# Patient Record
Sex: FEMALE | Race: BLACK OR AFRICAN AMERICAN | Employment: UNEMPLOYED | URBAN - METROPOLITAN AREA
[De-identification: names, ages, dates, MRNs, and addresses within clinical notes are randomized per-mention and may not be internally consistent; named-entity substitution may affect disease eponyms.]

---

## 2017-01-09 ENCOUNTER — HOSPITAL ENCOUNTER (OUTPATIENT)
Dept: ENDOSCOPY | Age: 57
Discharge: HOME | End: 2017-01-09
Attending: INTERNAL MEDICINE | Admitting: INTERNAL MEDICINE

## 2017-01-09 VITALS
SYSTOLIC BLOOD PRESSURE: 129 MMHG | BODY MASS INDEX: 43.4 KG/M2 | WEIGHT: 293 LBS | DIASTOLIC BLOOD PRESSURE: 75 MMHG | OXYGEN SATURATION: 95 % | RESPIRATION RATE: 20 BRPM | HEART RATE: 102 BPM | TEMPERATURE: 97.3 F | HEIGHT: 69 IN

## 2017-01-09 RX ORDER — FUROSEMIDE 20 MG/1
20 TABLET ORAL DAILY
Status: ON HOLD | COMMUNITY
End: 2017-02-03 | Stop reason: HOSPADM

## 2017-01-09 RX ORDER — SODIUM CHLORIDE 9 MG/ML
INJECTION, SOLUTION INTRAVENOUS CONTINUOUS
Status: DISCONTINUED | OUTPATIENT
Start: 2017-01-09 | End: 2017-01-10 | Stop reason: HOSPADM

## 2017-01-09 RX ORDER — POTASSIUM CHLORIDE 1.5 G/1.77G
20 POWDER, FOR SOLUTION ORAL DAILY
COMMUNITY
End: 2017-06-27

## 2017-01-09 RX ADMIN — SODIUM CHLORIDE: 9 INJECTION, SOLUTION INTRAVENOUS at 09:46

## 2017-01-09 ASSESSMENT — PAIN - FUNCTIONAL ASSESSMENT: PAIN_FUNCTIONAL_ASSESSMENT: 0-10

## 2017-01-09 ASSESSMENT — PAIN SCALES - GENERAL
PAINLEVEL_OUTOF10: 0
PAINLEVEL_OUTOF10: 0

## 2017-01-29 PROBLEM — J96.22 ACUTE ON CHRONIC RESPIRATORY FAILURE WITH HYPOXIA AND HYPERCAPNIA (HCC): Status: ACTIVE | Noted: 2017-01-29

## 2017-01-29 PROBLEM — J96.21 ACUTE ON CHRONIC RESPIRATORY FAILURE WITH HYPOXIA AND HYPERCAPNIA (HCC): Status: ACTIVE | Noted: 2017-01-29

## 2017-02-06 ENCOUNTER — TELEPHONE (OUTPATIENT)
Dept: CARDIOLOGY CLINIC | Age: 57
End: 2017-02-06

## 2017-02-10 ENCOUNTER — OFFICE VISIT (OUTPATIENT)
Dept: CARDIOLOGY CLINIC | Age: 57
End: 2017-02-10

## 2017-02-10 VITALS
WEIGHT: 289 LBS | SYSTOLIC BLOOD PRESSURE: 120 MMHG | DIASTOLIC BLOOD PRESSURE: 58 MMHG | HEART RATE: 88 BPM | HEIGHT: 71 IN | OXYGEN SATURATION: 90 % | BODY MASS INDEX: 40.46 KG/M2

## 2017-02-10 DIAGNOSIS — J96.12 CHRONIC RESPIRATORY FAILURE WITH HYPOXIA AND HYPERCAPNIA (HCC): ICD-10-CM

## 2017-02-10 DIAGNOSIS — E87.6 HYPOKALEMIA: ICD-10-CM

## 2017-02-10 DIAGNOSIS — N17.9 AKI (ACUTE KIDNEY INJURY) (HCC): ICD-10-CM

## 2017-02-10 DIAGNOSIS — J96.11 CHRONIC RESPIRATORY FAILURE WITH HYPOXIA AND HYPERCAPNIA (HCC): ICD-10-CM

## 2017-02-10 DIAGNOSIS — I50.33 ACUTE ON CHRONIC DIASTOLIC HEART FAILURE (HCC): Primary | ICD-10-CM

## 2017-02-10 DIAGNOSIS — I10 ESSENTIAL HYPERTENSION: ICD-10-CM

## 2017-02-10 PROCEDURE — 3017F COLORECTAL CA SCREEN DOC REV: CPT | Performed by: NURSE PRACTITIONER

## 2017-02-10 PROCEDURE — G8417 CALC BMI ABV UP PARAM F/U: HCPCS | Performed by: NURSE PRACTITIONER

## 2017-02-10 PROCEDURE — 1036F TOBACCO NON-USER: CPT | Performed by: NURSE PRACTITIONER

## 2017-02-10 PROCEDURE — G8484 FLU IMMUNIZE NO ADMIN: HCPCS | Performed by: NURSE PRACTITIONER

## 2017-02-10 PROCEDURE — 1111F DSCHRG MED/CURRENT MED MERGE: CPT | Performed by: NURSE PRACTITIONER

## 2017-02-10 PROCEDURE — 99213 OFFICE O/P EST LOW 20 MIN: CPT | Performed by: NURSE PRACTITIONER

## 2017-02-10 PROCEDURE — 3014F SCREEN MAMMO DOC REV: CPT | Performed by: NURSE PRACTITIONER

## 2017-02-10 PROCEDURE — G8427 DOCREV CUR MEDS BY ELIG CLIN: HCPCS | Performed by: NURSE PRACTITIONER

## 2017-02-10 RX ORDER — ESCITALOPRAM OXALATE 10 MG/1
10 TABLET ORAL DAILY
COMMUNITY
End: 2019-08-22

## 2017-03-21 ENCOUNTER — HOSPITAL ENCOUNTER (OUTPATIENT)
Dept: GENERAL RADIOLOGY | Age: 57
Discharge: HOME | End: 2017-03-21
Attending: FAMILY MEDICINE | Admitting: FAMILY MEDICINE

## 2017-03-21 DIAGNOSIS — R13.10 PROBLEMS WITH SWALLOWING AND MASTICATION: ICD-10-CM

## 2017-03-21 DIAGNOSIS — R13.10 DYSPHAGIA, UNSPECIFIED TYPE: ICD-10-CM

## 2017-03-21 DIAGNOSIS — R13.10 DYSPHAGIA: ICD-10-CM

## 2017-04-13 ENCOUNTER — TELEPHONE (OUTPATIENT)
Dept: CARDIOLOGY CLINIC | Age: 57
End: 2017-04-13

## 2017-05-18 ENCOUNTER — OFFICE VISIT (OUTPATIENT)
Dept: CARDIOLOGY CLINIC | Age: 57
End: 2017-05-18

## 2017-05-18 VITALS
WEIGHT: 293 LBS | HEIGHT: 69 IN | SYSTOLIC BLOOD PRESSURE: 112 MMHG | HEART RATE: 84 BPM | BODY MASS INDEX: 43.4 KG/M2 | DIASTOLIC BLOOD PRESSURE: 68 MMHG | OXYGEN SATURATION: 90 %

## 2017-05-18 DIAGNOSIS — I50.33 ACUTE ON CHRONIC DIASTOLIC HEART FAILURE (HCC): Primary | ICD-10-CM

## 2017-05-18 DIAGNOSIS — G47.33 OSA (OBSTRUCTIVE SLEEP APNEA): ICD-10-CM

## 2017-05-18 DIAGNOSIS — I10 ESSENTIAL HYPERTENSION: ICD-10-CM

## 2017-05-18 DIAGNOSIS — F17.200 SMOKER: ICD-10-CM

## 2017-05-18 DIAGNOSIS — E87.6 HYPOKALEMIA: ICD-10-CM

## 2017-05-18 DIAGNOSIS — E66.01 MORBID OBESITY DUE TO EXCESS CALORIES (HCC): ICD-10-CM

## 2017-05-18 PROCEDURE — 4004F PT TOBACCO SCREEN RCVD TLK: CPT | Performed by: NURSE PRACTITIONER

## 2017-05-18 PROCEDURE — 3014F SCREEN MAMMO DOC REV: CPT | Performed by: NURSE PRACTITIONER

## 2017-05-18 PROCEDURE — 99214 OFFICE O/P EST MOD 30 MIN: CPT | Performed by: NURSE PRACTITIONER

## 2017-05-18 PROCEDURE — 3017F COLORECTAL CA SCREEN DOC REV: CPT | Performed by: NURSE PRACTITIONER

## 2017-05-18 PROCEDURE — G8427 DOCREV CUR MEDS BY ELIG CLIN: HCPCS | Performed by: NURSE PRACTITIONER

## 2017-05-18 PROCEDURE — G8417 CALC BMI ABV UP PARAM F/U: HCPCS | Performed by: NURSE PRACTITIONER

## 2017-05-18 RX ORDER — POTASSIUM CHLORIDE 1.5 G/1.77G
20 POWDER, FOR SOLUTION ORAL DAILY
Qty: 30 EACH | Refills: 3 | Status: SHIPPED | OUTPATIENT
Start: 2017-05-18 | End: 2017-06-22 | Stop reason: SDUPTHER

## 2017-05-18 RX ORDER — METOPROLOL SUCCINATE 25 MG/1
25 TABLET, EXTENDED RELEASE ORAL NIGHTLY
Qty: 30 TABLET | Refills: 3 | Status: SHIPPED | OUTPATIENT
Start: 2017-05-18 | End: 2017-08-30 | Stop reason: SDUPTHER

## 2017-05-18 RX ORDER — TORSEMIDE 20 MG/1
TABLET ORAL
Qty: 90 TABLET | Refills: 3 | Status: SHIPPED | OUTPATIENT
Start: 2017-05-18 | End: 2017-10-02 | Stop reason: SDUPTHER

## 2017-05-18 RX ORDER — METFORMIN HYDROCHLORIDE 500 MG/1
500 TABLET, EXTENDED RELEASE ORAL
COMMUNITY
End: 2018-09-17 | Stop reason: ALTCHOICE

## 2017-06-22 ENCOUNTER — OFFICE VISIT (OUTPATIENT)
Dept: CARDIOLOGY CLINIC | Age: 57
End: 2017-06-22

## 2017-06-22 VITALS
OXYGEN SATURATION: 92 % | HEIGHT: 68 IN | SYSTOLIC BLOOD PRESSURE: 122 MMHG | DIASTOLIC BLOOD PRESSURE: 76 MMHG | BODY MASS INDEX: 44.41 KG/M2 | WEIGHT: 293 LBS | HEART RATE: 80 BPM

## 2017-06-22 DIAGNOSIS — I50.812 CHRONIC RIGHT-SIDED HF (HEART FAILURE) (HCC): ICD-10-CM

## 2017-06-22 DIAGNOSIS — I10 ESSENTIAL HYPERTENSION: ICD-10-CM

## 2017-06-22 DIAGNOSIS — I50.33 ACUTE ON CHRONIC DIASTOLIC HEART FAILURE (HCC): ICD-10-CM

## 2017-06-22 DIAGNOSIS — Z72.0 TOBACCO USE: ICD-10-CM

## 2017-06-22 DIAGNOSIS — E66.01 MORBID OBESITY DUE TO EXCESS CALORIES (HCC): ICD-10-CM

## 2017-06-22 DIAGNOSIS — G47.33 OSA (OBSTRUCTIVE SLEEP APNEA): ICD-10-CM

## 2017-06-22 DIAGNOSIS — I50.33 ACUTE ON CHRONIC DIASTOLIC HEART FAILURE (HCC): Primary | ICD-10-CM

## 2017-06-22 DIAGNOSIS — E87.6 HYPOKALEMIA: ICD-10-CM

## 2017-06-22 LAB
ANION GAP SERPL CALCULATED.3IONS-SCNC: 15 MMOL/L (ref 3–16)
BUN BLDV-MCNC: 21 MG/DL (ref 7–20)
CALCIUM SERPL-MCNC: 10.5 MG/DL (ref 8.3–10.6)
CHLORIDE BLD-SCNC: 100 MMOL/L (ref 99–110)
CO2: 27 MMOL/L (ref 21–32)
CREAT SERPL-MCNC: 0.8 MG/DL (ref 0.6–1.1)
GFR AFRICAN AMERICAN: >60
GFR NON-AFRICAN AMERICAN: >60
GLUCOSE BLD-MCNC: 73 MG/DL (ref 70–99)
POTASSIUM SERPL-SCNC: 5.5 MMOL/L (ref 3.5–5.1)
SODIUM BLD-SCNC: 142 MMOL/L (ref 136–145)

## 2017-06-22 PROCEDURE — G8427 DOCREV CUR MEDS BY ELIG CLIN: HCPCS | Performed by: NURSE PRACTITIONER

## 2017-06-22 PROCEDURE — 3014F SCREEN MAMMO DOC REV: CPT | Performed by: NURSE PRACTITIONER

## 2017-06-22 PROCEDURE — 99214 OFFICE O/P EST MOD 30 MIN: CPT | Performed by: NURSE PRACTITIONER

## 2017-06-22 PROCEDURE — G8417 CALC BMI ABV UP PARAM F/U: HCPCS | Performed by: NURSE PRACTITIONER

## 2017-06-22 PROCEDURE — 4004F PT TOBACCO SCREEN RCVD TLK: CPT | Performed by: NURSE PRACTITIONER

## 2017-06-22 PROCEDURE — 3017F COLORECTAL CA SCREEN DOC REV: CPT | Performed by: NURSE PRACTITIONER

## 2017-06-22 RX ORDER — FERROUS SULFATE 325(65) MG
325 TABLET ORAL
COMMUNITY
End: 2019-05-23 | Stop reason: ALTCHOICE

## 2017-06-22 RX ORDER — LORAZEPAM 1 MG/1
1 TABLET ORAL EVERY 8 HOURS PRN
Status: ON HOLD | COMMUNITY
End: 2019-04-22

## 2017-06-22 RX ORDER — LORATADINE 10 MG/1
10 TABLET ORAL DAILY
COMMUNITY

## 2017-06-22 RX ORDER — DIVALPROEX SODIUM 500 MG/1
500 TABLET, DELAYED RELEASE ORAL
COMMUNITY
End: 2019-04-30

## 2017-06-22 RX ORDER — LISINOPRIL 10 MG/1
10 TABLET ORAL DAILY
Status: ON HOLD | COMMUNITY
End: 2019-04-22

## 2017-08-30 DIAGNOSIS — I10 ESSENTIAL HYPERTENSION: ICD-10-CM

## 2017-08-30 DIAGNOSIS — I50.33 ACUTE ON CHRONIC DIASTOLIC HEART FAILURE (HCC): ICD-10-CM

## 2017-08-31 RX ORDER — METOPROLOL SUCCINATE 25 MG/1
TABLET, EXTENDED RELEASE ORAL
Qty: 30 TABLET | Refills: 5 | Status: SHIPPED | OUTPATIENT
Start: 2017-08-31 | End: 2017-09-22 | Stop reason: SDUPTHER

## 2017-09-22 ENCOUNTER — OFFICE VISIT (OUTPATIENT)
Dept: CARDIOLOGY CLINIC | Age: 57
End: 2017-09-22

## 2017-09-22 VITALS
DIASTOLIC BLOOD PRESSURE: 80 MMHG | HEIGHT: 69 IN | BODY MASS INDEX: 43.4 KG/M2 | OXYGEN SATURATION: 96 % | WEIGHT: 293 LBS | SYSTOLIC BLOOD PRESSURE: 110 MMHG | HEART RATE: 56 BPM

## 2017-09-22 DIAGNOSIS — I10 ESSENTIAL HYPERTENSION: ICD-10-CM

## 2017-09-22 DIAGNOSIS — Z72.0 TOBACCO USE: ICD-10-CM

## 2017-09-22 DIAGNOSIS — I50.32 CHRONIC DIASTOLIC HF (HEART FAILURE) (HCC): Primary | ICD-10-CM

## 2017-09-22 DIAGNOSIS — E66.01 MORBID OBESITY DUE TO EXCESS CALORIES (HCC): ICD-10-CM

## 2017-09-22 DIAGNOSIS — I50.33 ACUTE ON CHRONIC DIASTOLIC HEART FAILURE (HCC): ICD-10-CM

## 2017-09-22 DIAGNOSIS — G47.33 OSA (OBSTRUCTIVE SLEEP APNEA): ICD-10-CM

## 2017-09-22 DIAGNOSIS — I50.812 CHRONIC RIGHT-SIDED HF (HEART FAILURE) (HCC): ICD-10-CM

## 2017-09-22 PROCEDURE — 3014F SCREEN MAMMO DOC REV: CPT | Performed by: NURSE PRACTITIONER

## 2017-09-22 PROCEDURE — 99214 OFFICE O/P EST MOD 30 MIN: CPT | Performed by: NURSE PRACTITIONER

## 2017-09-22 PROCEDURE — G8417 CALC BMI ABV UP PARAM F/U: HCPCS | Performed by: NURSE PRACTITIONER

## 2017-09-22 PROCEDURE — 4004F PT TOBACCO SCREEN RCVD TLK: CPT | Performed by: NURSE PRACTITIONER

## 2017-09-22 PROCEDURE — 3017F COLORECTAL CA SCREEN DOC REV: CPT | Performed by: NURSE PRACTITIONER

## 2017-09-22 PROCEDURE — G8427 DOCREV CUR MEDS BY ELIG CLIN: HCPCS | Performed by: NURSE PRACTITIONER

## 2017-09-22 RX ORDER — ACETAMINOPHEN 500 MG
500 TABLET ORAL EVERY 6 HOURS PRN
COMMUNITY
End: 2019-05-23

## 2017-09-22 RX ORDER — POLYETHYLENE GLYCOL 3350 17 G/17G
17 POWDER, FOR SOLUTION ORAL DAILY PRN
COMMUNITY

## 2017-09-22 RX ORDER — POTASSIUM CHLORIDE 20 MEQ/1
20 TABLET, EXTENDED RELEASE ORAL DAILY
COMMUNITY
End: 2017-09-22 | Stop reason: ALTCHOICE

## 2017-09-22 RX ORDER — SODIUM CHLORIDE 1000 MG
1 TABLET, SOLUBLE MISCELLANEOUS DAILY
COMMUNITY
End: 2019-07-31

## 2017-09-22 RX ORDER — ONDANSETRON 4 MG/1
4 TABLET, ORALLY DISINTEGRATING ORAL EVERY 8 HOURS PRN
COMMUNITY
End: 2019-07-31 | Stop reason: ALTCHOICE

## 2017-09-22 RX ORDER — METOPROLOL SUCCINATE 25 MG/1
12.5 TABLET, EXTENDED RELEASE ORAL DAILY
Qty: 30 TABLET | Refills: 5
Start: 2017-09-22

## 2017-09-22 RX ORDER — ASCORBIC ACID 500 MG
500 TABLET ORAL 2 TIMES DAILY
COMMUNITY
End: 2019-05-23 | Stop reason: ALTCHOICE

## 2017-10-02 DIAGNOSIS — I50.33 ACUTE ON CHRONIC DIASTOLIC HEART FAILURE (HCC): ICD-10-CM

## 2017-10-03 RX ORDER — TORSEMIDE 20 MG/1
TABLET ORAL
Qty: 162 TABLET | Refills: 3 | Status: SHIPPED | OUTPATIENT
Start: 2017-10-03 | End: 2018-09-17 | Stop reason: ALTCHOICE

## 2018-04-05 ENCOUNTER — TELEPHONE (OUTPATIENT)
Dept: CARDIOLOGY CLINIC | Age: 58
End: 2018-04-05

## 2018-04-20 ENCOUNTER — OFFICE VISIT (OUTPATIENT)
Dept: CARDIOLOGY CLINIC | Age: 58
End: 2018-04-20

## 2018-04-20 VITALS
DIASTOLIC BLOOD PRESSURE: 72 MMHG | SYSTOLIC BLOOD PRESSURE: 116 MMHG | WEIGHT: 287 LBS | HEART RATE: 102 BPM | BODY MASS INDEX: 43.5 KG/M2 | OXYGEN SATURATION: 90 % | HEIGHT: 68 IN

## 2018-04-20 DIAGNOSIS — G47.33 OSA (OBSTRUCTIVE SLEEP APNEA): ICD-10-CM

## 2018-04-20 DIAGNOSIS — I10 ESSENTIAL HYPERTENSION: ICD-10-CM

## 2018-04-20 DIAGNOSIS — Z72.0 TOBACCO ABUSE: ICD-10-CM

## 2018-04-20 DIAGNOSIS — I50.32 CHRONIC DIASTOLIC HEART FAILURE (HCC): ICD-10-CM

## 2018-04-20 DIAGNOSIS — I50.32 CHRONIC DIASTOLIC HEART FAILURE (HCC): Primary | ICD-10-CM

## 2018-04-20 LAB
ANION GAP SERPL CALCULATED.3IONS-SCNC: 19 MMOL/L (ref 3–16)
BUN BLDV-MCNC: 14 MG/DL (ref 7–20)
CALCIUM SERPL-MCNC: 10.3 MG/DL (ref 8.3–10.6)
CHLORIDE BLD-SCNC: 96 MMOL/L (ref 99–110)
CO2: 26 MMOL/L (ref 21–32)
CREAT SERPL-MCNC: 0.5 MG/DL (ref 0.6–1.1)
GFR AFRICAN AMERICAN: >60
GFR NON-AFRICAN AMERICAN: >60
GLUCOSE BLD-MCNC: 78 MG/DL (ref 70–99)
POTASSIUM SERPL-SCNC: 4.4 MMOL/L (ref 3.5–5.1)
SODIUM BLD-SCNC: 141 MMOL/L (ref 136–145)

## 2018-04-20 PROCEDURE — 99214 OFFICE O/P EST MOD 30 MIN: CPT | Performed by: INTERNAL MEDICINE

## 2018-05-17 ENCOUNTER — OFFICE VISIT (OUTPATIENT)
Dept: PULMONOLOGY | Age: 58
End: 2018-05-17

## 2018-05-17 VITALS
OXYGEN SATURATION: 96 % | RESPIRATION RATE: 20 BRPM | SYSTOLIC BLOOD PRESSURE: 113 MMHG | DIASTOLIC BLOOD PRESSURE: 79 MMHG | HEIGHT: 68 IN | HEART RATE: 86 BPM | WEIGHT: 292 LBS | BODY MASS INDEX: 44.25 KG/M2 | TEMPERATURE: 98.4 F

## 2018-05-17 DIAGNOSIS — J39.8 TRACHEAL STENOSIS: ICD-10-CM

## 2018-05-17 DIAGNOSIS — J44.9 COPD, SEVERE (HCC): Primary | ICD-10-CM

## 2018-05-17 DIAGNOSIS — J96.11 CHRONIC RESPIRATORY FAILURE WITH HYPOXIA (HCC): ICD-10-CM

## 2018-05-17 DIAGNOSIS — Z72.0 TOBACCO ABUSE: ICD-10-CM

## 2018-05-17 PROCEDURE — 99214 OFFICE O/P EST MOD 30 MIN: CPT | Performed by: INTERNAL MEDICINE

## 2018-05-17 RX ORDER — ALBUTEROL SULFATE 90 UG/1
2 AEROSOL, METERED RESPIRATORY (INHALATION) EVERY 4 HOURS PRN
Qty: 1 INHALER | Refills: 11 | Status: ON HOLD | OUTPATIENT
Start: 2018-05-17 | End: 2019-04-22 | Stop reason: SDUPTHER

## 2018-08-01 ENCOUNTER — TELEPHONE (OUTPATIENT)
Dept: PULMONOLOGY | Age: 58
End: 2018-08-01

## 2018-08-01 NOTE — TELEPHONE ENCOUNTER
Left message asking for patient to call [phone] to schedule CT Chest then to call the office back to let us know that it was scheduled.

## 2018-08-10 ENCOUNTER — HOSPITAL ENCOUNTER (OUTPATIENT)
Dept: CT IMAGING | Age: 58
Discharge: HOME | End: 2018-08-10
Attending: INTERNAL MEDICINE | Admitting: INTERNAL MEDICINE

## 2018-08-10 DIAGNOSIS — Z72.0 TOBACCO ABUSE: ICD-10-CM

## 2018-08-10 DIAGNOSIS — J44.9 COPD, SEVERE (HCC): ICD-10-CM

## 2018-08-10 DIAGNOSIS — J44.9 CHRONIC OBSTRUCTIVE PULMONARY DISEASE (HCC): ICD-10-CM

## 2018-08-10 DIAGNOSIS — J39.8 TRACHEAL STENOSIS: ICD-10-CM

## 2018-08-14 ENCOUNTER — TELEPHONE (OUTPATIENT)
Dept: PULMONOLOGY | Age: 58
End: 2018-08-14

## 2018-08-15 NOTE — TELEPHONE ENCOUNTER
Cristel called in that they have made final denial and they are sending a fax that if we want to make appeal we can.

## 2018-08-15 NOTE — TELEPHONE ENCOUNTER
Insurance said that pref procedure is code  low dose CT scan vs regular ct chest   based on clinical information that was supplied     They are saying because the procedure was already done then an appeal would need to be processed. I have informed Stacey of this.

## 2018-08-16 NOTE — TELEPHONE ENCOUNTER
I spoke to [facility] at insurance verification [id number]. She said that the prior auth was initiated on 8/9/18 they don't cancell the test unless more than 3 days out and if denied the patient won't be liable. She did say that the case is now in denials and appeals department and they only take faxes there is no email and no phone number - we can send a fax to [phone].

## 2018-09-17 ENCOUNTER — OFFICE VISIT (OUTPATIENT)
Dept: PULMONOLOGY | Age: 58
End: 2018-09-17

## 2018-09-17 VITALS
OXYGEN SATURATION: 92 % | HEIGHT: 68 IN | TEMPERATURE: 98.5 F | BODY MASS INDEX: 44.41 KG/M2 | HEART RATE: 107 BPM | WEIGHT: 293 LBS | DIASTOLIC BLOOD PRESSURE: 69 MMHG | SYSTOLIC BLOOD PRESSURE: 101 MMHG | RESPIRATION RATE: 20 BRPM

## 2018-09-17 DIAGNOSIS — J40 BRONCHITIS: ICD-10-CM

## 2018-09-17 DIAGNOSIS — J44.9 COPD, SEVERE (HCC): Primary | ICD-10-CM

## 2018-09-17 DIAGNOSIS — J96.11 CHRONIC RESPIRATORY FAILURE WITH HYPOXIA (HCC): ICD-10-CM

## 2018-09-17 DIAGNOSIS — Z72.0 TOBACCO ABUSE: ICD-10-CM

## 2018-09-17 DIAGNOSIS — J39.8 TRACHEAL STENOSIS: ICD-10-CM

## 2018-09-17 PROCEDURE — 99214 OFFICE O/P EST MOD 30 MIN: CPT | Performed by: INTERNAL MEDICINE

## 2018-09-17 RX ORDER — FUROSEMIDE 40 MG/1
40 TABLET ORAL DAILY
COMMUNITY
End: 2018-10-22 | Stop reason: DRUGHIGH

## 2018-09-17 RX ORDER — CLONAZEPAM 0.5 MG/1
0.5 TABLET ORAL 2 TIMES DAILY PRN
COMMUNITY
End: 2019-05-23 | Stop reason: DRUGHIGH

## 2018-09-17 RX ORDER — ALBUTEROL SULFATE 2.5 MG/3ML
2.5 SOLUTION RESPIRATORY (INHALATION) EVERY 4 HOURS PRN
Qty: 360 VIAL | Refills: 3 | Status: ON HOLD | OUTPATIENT
Start: 2018-09-17 | End: 2019-04-22 | Stop reason: SDUPTHER

## 2018-09-17 RX ORDER — AZITHROMYCIN 250 MG/1
250 TABLET, FILM COATED ORAL DAILY
Qty: 6 TABLET | Refills: 0 | Status: SHIPPED | OUTPATIENT
Start: 2018-09-17 | End: 2019-01-21 | Stop reason: ALTCHOICE

## 2018-09-17 NOTE — PATIENT INSTRUCTIONS
Need to take Advair one puff twice a day    Need to take spiriva one puff once a day    Use albuterol as needed for shortness of breath. Can use albuterol every 4 hours as needed    Will send script for albuterol to [facility].   Use with Klash machine    Take azithromycin as directed    Try to quit tobacco    Stay active

## 2018-09-17 NOTE — PROGRESS NOTES
time.  She does need to continue with inhalers.   Needs to stop smoking too      Pulmonary Rehab:  Ordered last time but she says she cannot attend due to receiving home care    Lung Cancer Screening CT:  Up to date as of 8/2018    Follow up in 4 months

## 2018-10-22 ENCOUNTER — OFFICE VISIT (OUTPATIENT)
Dept: CARDIOLOGY CLINIC | Age: 58
End: 2018-10-22
Payer: COMMERCIAL

## 2018-10-22 VITALS
SYSTOLIC BLOOD PRESSURE: 100 MMHG | HEIGHT: 68 IN | OXYGEN SATURATION: 88 % | WEIGHT: 293 LBS | DIASTOLIC BLOOD PRESSURE: 60 MMHG | BODY MASS INDEX: 44.41 KG/M2 | HEART RATE: 106 BPM

## 2018-10-22 DIAGNOSIS — I50.32 CHRONIC DIASTOLIC HEART FAILURE (HCC): ICD-10-CM

## 2018-10-22 DIAGNOSIS — I50.32 CHRONIC DIASTOLIC HEART FAILURE (HCC): Primary | ICD-10-CM

## 2018-10-22 DIAGNOSIS — G47.33 OSA (OBSTRUCTIVE SLEEP APNEA): ICD-10-CM

## 2018-10-22 DIAGNOSIS — I10 ESSENTIAL HYPERTENSION: ICD-10-CM

## 2018-10-22 DIAGNOSIS — Z72.0 TOBACCO USE: ICD-10-CM

## 2018-10-22 LAB
ANION GAP SERPL CALCULATED.3IONS-SCNC: 19 MMOL/L (ref 3–16)
BUN BLDV-MCNC: 16 MG/DL (ref 7–20)
CALCIUM SERPL-MCNC: 10.2 MG/DL (ref 8.3–10.6)
CHLORIDE BLD-SCNC: 95 MMOL/L (ref 99–110)
CO2: 26 MMOL/L (ref 21–32)
CREAT SERPL-MCNC: 0.8 MG/DL (ref 0.6–1.2)
GFR AFRICAN AMERICAN: >60
GFR NON-AFRICAN AMERICAN: >60
GLUCOSE BLD-MCNC: 71 MG/DL (ref 70–99)
POTASSIUM SERPL-SCNC: 5.1 MMOL/L (ref 3.5–5.1)
SODIUM BLD-SCNC: 140 MMOL/L (ref 136–145)

## 2018-10-22 PROCEDURE — 93000 ELECTROCARDIOGRAM COMPLETE: CPT | Performed by: INTERNAL MEDICINE

## 2018-10-22 PROCEDURE — 99214 OFFICE O/P EST MOD 30 MIN: CPT | Performed by: INTERNAL MEDICINE

## 2018-10-22 RX ORDER — TORSEMIDE 20 MG/1
20 TABLET ORAL 2 TIMES DAILY
Qty: 60 TABLET | Refills: 3 | Status: SHIPPED | OUTPATIENT
Start: 2018-10-22 | End: 2019-02-17 | Stop reason: SDUPTHER

## 2018-10-22 RX ORDER — FUROSEMIDE 40 MG/1
40 TABLET ORAL 2 TIMES DAILY
Qty: 60 TABLET | Refills: 5 | Status: SHIPPED
Start: 2018-10-22 | End: 2018-10-22

## 2018-10-22 NOTE — PATIENT INSTRUCTIONS
Patient Education        Heart Failure: Care Instructions  Your Care Instructions    Heart failure occurs when your heart does not pump as much blood as the body needs. Failure does not mean that the heart has stopped pumping but rather that it is not pumping as well as it should. Over time, this causes fluid buildup in your lungs and other parts of your body. Fluid buildup can cause shortness of breath, fatigue, swollen ankles, and other problems. By taking medicines regularly, reducing sodium (salt) in your diet, checking your weight every day, and making lifestyle changes, you can feel better and live longer. Follow-up care is a key part of your treatment and safety. Be sure to make and go to all appointments, and call your doctor if you are having problems. It's also a good idea to know your test results and keep a list of the medicines you take. How can you care for yourself at home? Medicines    · Be safe with medicines. Take your medicines exactly as prescribed. Call your doctor if you think you are having a problem with your medicine.     · Do not take any vitamins, over-the-counter medicine, or herbal products without talking to your doctor first. Dahlia Pepper not take ibuprofen (Advil or Motrin) and naproxen (Aleve) without talking to your doctor first. They could make your heart failure worse.     · You may be taking some of the following medicine. ¨ Beta-blockers can slow heart rate, decrease blood pressure, and improve your condition. Taking a beta-blocker may lower your chance of needing to be hospitalized. ¨ Angiotensin-converting enzyme inhibitors (ACEIs) reduce the heart's workload, lower blood pressure, and reduce swelling. Taking an ACEI may lower your chance of needing to be hospitalized again. ¨ Angiotensin II receptor blockers (ARBs) work like ACEIs. Your doctor may prescribe them instead of ACEIs. ¨ Diuretics, also called water pills, reduce swelling.   ¨ Potassium supplements replace this quitting, talk to your doctor about stop-smoking programs and medicines. These can increase your chances of quitting for good. Quitting smoking may be the most important step you can take to protect your heart.     · Limit alcohol to 2 drinks a day for men and 1 drink a day for women. Too much alcohol can cause health problems.     · Avoid getting sick from colds and the flu. Get a pneumococcal vaccine shot. If you have had one before, ask your doctor whether you need another dose. Get a flu shot each year. If you must be around people with colds or the flu, wash your hands often. When should you call for help? Call 911 if you have symptoms of sudden heart failure such as:    · You have severe trouble breathing.     · You cough up pink, foamy mucus.     · You have a new irregular or rapid heartbeat.    Call your doctor now or seek immediate medical care if:    · You have new or increased shortness of breath.     · You are dizzy or lightheaded, or you feel like you may faint.     · You have sudden weight gain, such as more than 2 to 3 pounds in a day or 5 pounds in a week. (Your doctor may suggest a different range of weight gain.)     · You have increased swelling in your legs, ankles, or feet.     · You are suddenly so tired or weak that you cannot do your usual activities.    Watch closely for changes in your health, and be sure to contact your doctor if you develop new symptoms. Where can you learn more? Go to https://LineRate Systems.[employer] org and sign in to your Ca[employer] account. Enter O019 in the BuscapÃ© box to learn more about \"Heart Failure: Care Instructions. \"     If you do not have an account, please click on the \"Sign Up Now\" link. Current as of: May 10, 2017  Content Version: 11.7  © 0398-6607 [employer], Incorporated. Care instructions adapted under license by [email or web address] ([facility]).  If you have questions about a medical condition or this instruction, always ask your healthcare professional. [address] any warranty or liability for your use of this information. Patient Education        Low Sodium Diet (2,000 Milligram): Care Instructions  Your Care Instructions    Too much sodium causes your body to hold on to extra water. This can raise your blood pressure and force your heart and kidneys to work harder. In very serious cases, this could cause you to be put in the hospital. It might even be life-threatening. By limiting sodium, you will feel better and lower your risk of serious problems. The most common source of sodium is salt. People get most of the salt in their diet from canned, prepared, and packaged foods. Fast food and restaurant meals also are very high in sodium. Your doctor will probably limit your sodium to less than 2,000 milligrams (mg) a day. This limit counts all the sodium in prepared and packaged foods and any salt you add to your food. Follow-up care is a key part of your treatment and safety. Be sure to make and go to all appointments, and call your doctor if you are having problems. It's also a good idea to know your test results and keep a list of the medicines you take. How can you care for yourself at home? Read food labels  · Read labels on cans and food packages. The labels tell you how much sodium is in each serving. Make sure that you look at the serving size. If you eat more than the serving size, you have eaten more sodium. · Food labels also tell you the Percent Daily Value for sodium. Choose products with low Percent Daily Values for sodium. · Be aware that sodium can come in forms other than salt, including monosodium glutamate (MSG), sodium citrate, and sodium bicarbonate (baking soda). MSG is often added to Asian food. When you eat out, you can sometimes ask for food without MSG or added salt.   Buy low-sodium foods  · Buy foods that are labeled \"unsalted\" (no salt added), \"sodium-free\" (less than 5 mg of sodium per low-sodium. ¨ Canned vegetables, unless labeled sodium-free or low-sodium. ¨ Western Tamanna fries, pizza, tacos, and other fast foods. ¨ Pickles, olives, ketchup, and other condiments, especially soy sauce, unless labeled sodium-free or low-sodium. Where can you learn more? Go to https://Simple-Fillperamanaewsera.Apptera. org and sign in to your Favbuy account. Enter W703 in the Glasses Direct box to learn more about \"Low Sodium Diet (2,000 Milligram): Care Instructions. \"     If you do not have an account, please click on the \"Sign Up Now\" link. Current as of: May 12, 2017  Content Version: 11.7  © 7184-3287 CommonTime, Incorporated. Care instructions adapted under license by [facility] ([facility]). If you have questions about a medical condition or this instruction, always ask your healthcare professional. [place] [address] any warranty or liability for your use of this information. ONCE YOU FINISH FUROSEMIDE PRESCRIPTION, BEGIN TAKING TORSEMIDE 20 MG TWICE DAILY (MORNING AND EVENING). LOW SODIUM DIET, NO MORE THAT 2,000 MILLIGRAMS OF SODIUM DAILY. FLUID RESTRICTION, NO MORE THAN 48 OUNCES OF FLUID DAILY.

## 2018-11-08 ENCOUNTER — HOSPITAL ENCOUNTER (OUTPATIENT)
Dept: CARDIAC REHAB | Age: 58
Setting detail: THERAPIES SERIES
Discharge: HOME | End: 2018-11-08
Payer: COMMERCIAL

## 2018-12-27 ENCOUNTER — HOSPITAL ENCOUNTER (OUTPATIENT)
Dept: CARDIAC REHAB | Age: 58
Setting detail: THERAPIES SERIES
Discharge: HOME | End: 2018-12-27
Payer: COMMERCIAL

## 2018-12-27 VITALS — BODY MASS INDEX: 45.92 KG/M2 | WEIGHT: 293 LBS

## 2018-12-27 PROCEDURE — G0424 PULMONARY REHAB W EXER: HCPCS

## 2018-12-27 NOTE — PROGRESS NOTES
[facility] PULMONARY REHABILITATION ORDER  Medical Director:  Dr. Cassie Willis  (X)Phase II Pulmonary Rehabilitation [facility] Facility-based, supervised exercise with SpO2 / HR monitoring and Oxygen Titration (if necessary) each session, 2-3 times weekly, with individualized education sessions. Patient Name: Marcelina Bernard  : 1960  Referring Physician: Dr. Olegario Adams   Date: 2018    Medically Necessary Pulmonary Rehab for:  Severe COPD (from my dictation or the PFT report), which is GOLD Stage 3. Physician Prescribed Exercise:  Length of program:  Up to 36 sessions, subject to insurance limitations. Program to include aerobic endurance conditioning, resistance training (RT), step training (ST), flexibility training, and education (all relevant topics including psychosocial assessment). FITT Principles + Progression for Exercise Prescription (also found on the ITP):     Frequency: 2-3x / wk for up to 36 sessions    Intensity:   Set from Initial 6MWT (Feet achieved converted to METs)   Type:          Aerobic and Strength (Treadmill, AD, NuStep, SciFit, UBE, RT, ST)   Time:        15-60 min. of Treatment; Aerobic, RT, ST, Rests and Education  Progression:  1-2 minute increase in Time, per Type, per session, 5-20% increase in Intensity per week if SpO2 >88 AND [place] RPE/RPD is <14      Note:  These are guidelines. The Pulmonary Rehab staff may adjust the treatment to suit the patient's individual needs, goals, oxygen saturation and functional level. Plan of Care:  Pulmonary Rehab aerobic endurance and strength training sessions for a total of 30-91 min/day, including Education time, 2-3 days/week with suggested supplemented matching minutes of walking at home on most days not participating in Pulmonary Rehab. Patient is willing to cooperate and participate in the plan of care.  Patient is physically able, motivated, and willing to participate in HI, and I

## 2018-12-27 NOTE — PROGRESS NOTES
[facility] Facility-Based Therapy for COPD  INDIVIDUAL TREATMENT PLAN (ITP)     NAME: Sarah Jimenez  YOB: 1960  Account Number: [de-identified]  AGE: 59 y.o. Diagnosis: Severe COPD which is GOLD Stage 3. PFT: Post Bronch FEV1/FVC: 85%, FEV1: 33% FVC: 37%  Notes: Medicare requirements for Pulmonary Rehabilitation include a PFT within the last 12 months revealing: FEV1/FVC <70, and FEV1 <80%, and documentation from the referring physician stating that the patient has quit smoking or will participate in smoking cessation activities prior to, or during the Pulmonary Rehab program.  A 6 Minute Walk Test (6 MWT) at the beginning and end of the program is also indicated.   GLOSSARY: PF= Physical Fitness  TM=Treadmill  AD= Schwinn AirDyne Bike  UBE=Upperbody Ergometry LBE=Lowerbody Ergometer  NS=NuStep SF= SciFit  ST=Step Training  RT=Resistance Training   PLB=Pursed Lip Breathing   DY= Dynabands  HW= Handweights   Cybex RT= Cybex Mult istation weight machine   RB=Recumbent    REFERRING PHYSICIAN: Dr. Claire Ambrocio History:    Last hospital admit for Pulmonary issue:     Past Medical History:   Diagnosis Date    [facility]     Arthritis     Asthma     CAD (coronary artery disease)     Congestive heart failure (HCC)     COPD (chronic obstructive pulmonary disease) (HCC)     Dependence on supplemental oxygen     GERD (gastroesophageal reflux disease)     Heart disease     Hypertension     Muscle weakness     Obesity     Schizophrenia ([address]     Type 2 diabetes mellitus without complication ([facility]        Surgical History:     Past Surgical History:   Procedure Laterality Date    ANKLE SURGERY      error       Major Symptoms:     Cough/sputum Yes- white sputum      Wheezing  Yes     Chest Discomfort  No     Orthopnea  Yes- 3-4 pillows     Sleep Disturbances Yes wakes 3 x nightly to If pt has balance or orthopedic issues:  Interventions:                    Rate your physical   fitness (PF)?:   /10        -30 day PF goal:  /10    EDUCATION  [] Understands proper set/up O2 use  []  Understands SpO2 and RPD? [] Aerobic progression explained? []  Intensity progression explained? GOALS                                                                                           Rate your physical   fitness (PF)?:   /10        -30 day PF goal:  /10    EDUCATION   []  Home Activity education offered  [] Stretching/ Flexibility education offered  [] Attended Benefits of Exercise Class  []  Attended Resistance Training Class                                                    GOALS                                                                                           Rate your physical   fitness (PF)?:   /10    -30 day PF goal:  /10    EDUCATION  [] Attended all individually relevant exercise education sessions? []  Knows current exercise goals and recmndtns?:                                                  Goals Achieved? Rate your physical fitness now?:     /10  Handgrip:  Right:  Left:    Discharge  EDUCATION  []  Attended all individually relevant exercise education sessions?    [] Knows current exercise goals and recmndtns?:                                                                                        PHYSICIAN DIRECTED   EXERCISE RX     RPE : 11 - 14  Titrate O2 to keep SpO2 >89%    Frequency (F): 2-3x/wk in Rehab,         Initial Intensity : 1.7 METs (from 6MWT)   1-1.4 ( 60-80% of walk speed for TM)    Type (T): Aerobic (TM,NS, SF, AE, AB, RB, EL, Arc), RT 1-3#, 8-16 reps    CAN USE ALL EQUIPMENT EXCEPT       Time (Ti): Aerobic:   15-40 min               Progression: 1-2 min total time for TM, AB, NS, SF or Arm ergometer per session or when a steady state has occurred in RPE if  SpO2 and HR levels are acceptable           PHYSICIAN DIRECTED   EXERCISE RX       Titrate O2 to keep SpO2 >89%    Frequency (F): 2-3x/wk in Rehab, 1-3x/wk home walking       Intensity (I):  Initial + 5-10% increase each week or when a steady state has occurred in RPE if  SpO2 and HR levels are acceptable  Type (T)  Aerobic (TM,NS, SFR,SFS, AE, AB, RB), RT   8-16 reps    CAN USE ALL EQUIPMENT EXCEPT        Time (Ti): Aerobic:   30-40 min        Progression:   1-2 min total time for TM, AB, NS, SF or Arm ergometer per session or when a steady state has occurred in RPE if  SpO2 and HR levels are acceptable        Is pt. progressing in rehab?:               PHYSICIAN DIRECTED   EXERCISE RX       Titrate O2 to keep SpO2 >89%    Frequency (F): 2-3x/wk in Rehab, 1-3x/wk home walking       Intensity (I):  Initial + 5-10% increase each week when a steady state has occurred in RPE if  SpO2 and HR levels are acceptable  Type (T)  Aerobic (TM,NS, SFR,SFS, AE, AB, RB), RT   8-16 reps    CAN USE ALL EQUIPMENT EXCEPT        Time (Ti): Aerobic:   30-50 min     Progression:   1-2 min total time for TM, AB, NS, SF or Arm ergometer per session or when a steady state has occurred in RPE if  SpO2 and HR levels are acceptable              Is pt. progressing in rehab?:                 PHYSICIAN DIRECTED   EXERCISE RX       Titrate O2 to keep SpO2 >89%    Frequency (F): 2-3x/wk in Rehab, 1-3x/wk home walking       Intensity (I):  Initial + 5-10% increase each week when a steady state has occurred in RPE if  SpO2 and HR levels are acceptable  Type (T):   Aerobic (TM,NS, SFR,SFS, AE, AB, RB), RT   8-16 reps    CAN USE ALL EQUIPMENT EXCEPT          Time (Ti): Aerobic:   30-58 min         Progression:   1-2 min total time for TM, AB, NS, SF or Arm ergometer per session or when a steady state has occurred in RPE if  SpO2 and HR levels are acceptable            Is pt. progressing in rehab?:               Final Intensity (I): See below

## 2018-12-28 NOTE — PROGRESS NOTES
Patient presented today for her initial Pulmonary Rehab evaluation. Program explained. Tour of unit given and staff introduced. Patient instructed on warm up/cool down stretches. 10 min of exercise performed on cardiac monitor.

## 2019-01-03 ENCOUNTER — HOSPITAL ENCOUNTER (OUTPATIENT)
Dept: CARDIAC REHAB | Age: 59
Setting detail: THERAPIES SERIES
Discharge: HOME | End: 2019-01-03
Payer: COMMERCIAL

## 2019-01-03 VITALS — BODY MASS INDEX: 45.84 KG/M2 | WEIGHT: 293 LBS

## 2019-01-03 PROCEDURE — G0424 PULMONARY REHAB W EXER: HCPCS

## 2019-01-08 ENCOUNTER — HOSPITAL ENCOUNTER (OUTPATIENT)
Dept: CARDIAC REHAB | Age: 59
Setting detail: THERAPIES SERIES
Discharge: HOME | End: 2019-01-08
Payer: COMMERCIAL

## 2019-01-08 VITALS — BODY MASS INDEX: 46.24 KG/M2 | WEIGHT: 293 LBS

## 2019-01-08 PROCEDURE — G0424 PULMONARY REHAB W EXER: HCPCS

## 2019-01-10 ENCOUNTER — HOSPITAL ENCOUNTER (OUTPATIENT)
Dept: CARDIAC REHAB | Age: 59
Setting detail: THERAPIES SERIES
Discharge: HOME | End: 2019-01-10
Payer: COMMERCIAL

## 2019-01-10 VITALS — WEIGHT: 293 LBS | BODY MASS INDEX: 46.53 KG/M2

## 2019-01-10 PROCEDURE — G0424 PULMONARY REHAB W EXER: HCPCS

## 2019-01-15 ENCOUNTER — HOSPITAL ENCOUNTER (OUTPATIENT)
Dept: CARDIAC REHAB | Age: 59
Setting detail: THERAPIES SERIES
Discharge: HOME | End: 2019-01-15
Payer: COMMERCIAL

## 2019-01-15 VITALS — BODY MASS INDEX: 46.31 KG/M2 | WEIGHT: 293 LBS

## 2019-01-15 PROCEDURE — G0424 PULMONARY REHAB W EXER: HCPCS

## 2019-01-21 ENCOUNTER — OFFICE VISIT (OUTPATIENT)
Dept: PULMONOLOGY | Age: 59
End: 2019-01-21
Payer: COMMERCIAL

## 2019-01-21 VITALS
DIASTOLIC BLOOD PRESSURE: 60 MMHG | TEMPERATURE: 98.6 F | BODY MASS INDEX: 44.41 KG/M2 | SYSTOLIC BLOOD PRESSURE: 120 MMHG | HEIGHT: 68 IN | RESPIRATION RATE: 20 BRPM | HEART RATE: 111 BPM | OXYGEN SATURATION: 90 % | WEIGHT: 293 LBS

## 2019-01-21 DIAGNOSIS — J44.9 COPD, SEVERE (HCC): Primary | ICD-10-CM

## 2019-01-21 DIAGNOSIS — Z72.0 TOBACCO ABUSE: ICD-10-CM

## 2019-01-21 DIAGNOSIS — J39.8 TRACHEAL STENOSIS: ICD-10-CM

## 2019-01-21 DIAGNOSIS — J96.11 CHRONIC RESPIRATORY FAILURE WITH HYPOXIA (HCC): ICD-10-CM

## 2019-01-21 PROCEDURE — 99214 OFFICE O/P EST MOD 30 MIN: CPT | Performed by: INTERNAL MEDICINE

## 2019-01-21 RX ORDER — ALBUTEROL SULFATE 90 UG/1
2 AEROSOL, METERED RESPIRATORY (INHALATION) EVERY 4 HOURS PRN
Qty: 1 INHALER | Refills: 11 | Status: SHIPPED | OUTPATIENT
Start: 2019-01-21

## 2019-01-21 RX ORDER — ALBUTEROL SULFATE 2.5 MG/3ML
2.5 SOLUTION RESPIRATORY (INHALATION) EVERY 4 HOURS PRN
Qty: 360 VIAL | Refills: 11 | Status: SHIPPED | OUTPATIENT
Start: 2019-01-21

## 2019-01-22 ENCOUNTER — HOSPITAL ENCOUNTER (OUTPATIENT)
Dept: CARDIAC REHAB | Age: 59
Setting detail: THERAPIES SERIES
Discharge: HOME | End: 2019-01-22
Payer: COMMERCIAL

## 2019-01-22 VITALS — BODY MASS INDEX: 46.3 KG/M2 | WEIGHT: 293 LBS

## 2019-01-22 PROCEDURE — G0424 PULMONARY REHAB W EXER: HCPCS

## 2019-01-24 ENCOUNTER — HOSPITAL ENCOUNTER (OUTPATIENT)
Dept: CARDIAC REHAB | Age: 59
Setting detail: THERAPIES SERIES
Discharge: HOME | End: 2019-01-24
Payer: COMMERCIAL

## 2019-01-24 VITALS — BODY MASS INDEX: 46.68 KG/M2 | WEIGHT: 293 LBS

## 2019-01-24 PROCEDURE — G0424 PULMONARY REHAB W EXER: HCPCS

## 2019-01-29 ENCOUNTER — HOSPITAL ENCOUNTER (OUTPATIENT)
Dept: CARDIAC REHAB | Age: 59
Setting detail: THERAPIES SERIES
Discharge: HOME | End: 2019-01-29
Payer: COMMERCIAL

## 2019-01-29 VITALS — BODY MASS INDEX: 46.83 KG/M2 | WEIGHT: 293 LBS

## 2019-01-29 PROCEDURE — G0424 PULMONARY REHAB W EXER: HCPCS

## 2019-01-30 ENCOUNTER — TELEPHONE (OUTPATIENT)
Dept: PULMONOLOGY | Age: 59
End: 2019-01-30

## 2019-01-30 DIAGNOSIS — J44.9 COPD, SEVERE (HCC): Primary | ICD-10-CM

## 2019-01-31 ENCOUNTER — HOSPITAL ENCOUNTER (OUTPATIENT)
Dept: CARDIAC REHAB | Age: 59
Setting detail: THERAPIES SERIES
Discharge: HOME | End: 2019-01-31
Payer: COMMERCIAL

## 2019-02-05 ENCOUNTER — HOSPITAL ENCOUNTER (OUTPATIENT)
Dept: CARDIAC REHAB | Age: 59
Setting detail: THERAPIES SERIES
Discharge: HOME | End: 2019-02-05
Payer: COMMERCIAL

## 2019-02-05 VITALS — WEIGHT: 293 LBS | BODY MASS INDEX: 46.27 KG/M2

## 2019-02-05 PROCEDURE — G0424 PULMONARY REHAB W EXER: HCPCS

## 2019-02-07 ENCOUNTER — HOSPITAL ENCOUNTER (OUTPATIENT)
Dept: CARDIAC REHAB | Age: 59
Setting detail: THERAPIES SERIES
Discharge: HOME | End: 2019-02-07
Payer: COMMERCIAL

## 2019-02-12 ENCOUNTER — HOSPITAL ENCOUNTER (OUTPATIENT)
Dept: CARDIAC REHAB | Age: 59
Setting detail: THERAPIES SERIES
Discharge: HOME | End: 2019-02-12
Payer: COMMERCIAL

## 2019-02-12 PROCEDURE — G0424 PULMONARY REHAB W EXER: HCPCS

## 2019-02-19 ENCOUNTER — HOSPITAL ENCOUNTER (OUTPATIENT)
Dept: CARDIAC REHAB | Age: 59
Setting detail: THERAPIES SERIES
End: 2019-02-19
Payer: COMMERCIAL

## 2019-02-19 RX ORDER — TORSEMIDE 20 MG/1
TABLET ORAL
Qty: 60 TABLET | Refills: 3 | Status: SHIPPED | OUTPATIENT
Start: 2019-02-19 | End: 2019-04-30

## 2019-02-21 ENCOUNTER — HOSPITAL ENCOUNTER (OUTPATIENT)
Dept: CARDIAC REHAB | Age: 59
Setting detail: THERAPIES SERIES
Discharge: HOME | End: 2019-02-21
Payer: COMMERCIAL

## 2019-02-21 VITALS — WEIGHT: 293 LBS | BODY MASS INDEX: 46.74 KG/M2

## 2019-02-21 PROCEDURE — G0424 PULMONARY REHAB W EXER: HCPCS

## 2019-02-26 ENCOUNTER — HOSPITAL ENCOUNTER (OUTPATIENT)
Dept: CARDIAC REHAB | Age: 59
Setting detail: THERAPIES SERIES
Discharge: HOME | End: 2019-02-26
Payer: COMMERCIAL

## 2019-02-26 NOTE — PROGRESS NOTES
urinate     Edema   No     Dyspnea  Yes- exertional    Oxygen Therapy:     Home O2   3-4 lpm  []  Prn  [x] continuous  []  HS     [x] CPAP- does not tolerate []  BiPAP     Company:   [employer]        Comments:    Occupational/ Recreational Activities:    Employment Status:  []  FT  []  PT  [x] Disabled [] Retired    Comments:       Occupation:          Hobbies/Leisure activities: TV, reading, computer    Social/Spiritual:        Social History     Social History    Marital status: Single     Spouse name: N/A    Number of children: N/A    Years of education: N/A     Social History Main Topics    Smoking status: Heavy Tobacco Smoker     Packs/day: 1.00     Years: 40.00    Smokeless tobacco: Never Used      Comment: 6 cig QD.  Alcohol use Yes    Drug use: No    Sexual activity: Not Currently     Other Topics Concern    Not on file     Social History Narrative    No narrative on file          Do you live alone: [x]  Alone []  with spouse/family       Do you have stairs in your home  [x]  no []  yes        Comment:       Transportation  []  drive self []  Family/friend  [x]  Transportation service. Comment:       Cultural/Spiritual Influences: (Sabianist groups, etc)       Any Cultural or Adventist practices we should be aware of?                      Fall Risk Assessment:     []  Cognitive Impairment [x]  Balance-\"ccasionally may balance is bad\"   []  Fall History   []  Visual Difficulty   [x]  Dizziness-occasional   []  Other Comments:    Physical Assessment:    Color: [x]  natural []  pale [] cyanotic []  flushed []  jaundice    Skin Integrity [x]  intact [] other:    Heart Rate 110  Resp Rate 20      Breath Sounds: Diminished throughout    Blood Pressures Sitting: Rt arm 103/67  Left arm: 98/64       Standing Rt arm   Left arm: 100/70    Resting SpO2: 96% 3L  Resp effort/pattern: Easy/regular at rest      Peripheral pulses: Yes    Edema:  No    Numbness/tingling:  occasional    Orthopedic/exercise limitations:  No      Psychosocial assessment:    Support System: Family- Mother, daughter, brother    Physical/Behavioral signs of abuse/neglect:    Advance Directives:  No  [] info given-declined    Learning Preferences: Primary Language:English        Preferred method of learning []  video []  handouts        [] listening/lecture   [x]  all    Memory impairment? No     EXERCISE  Initial Assessment  Day 1 EXERCISE  Intervention  Day 2-30 EXERCISE  Re-Assessment  Day 31-60 EXERCISE  Re-Assessment  Day 61-90+ EXERCISE  Last Day   Date:   12/27/18 Date: 1/24/19 Date: 2/26/19- SEE NOTE BELOW Date:  Date:           Pre Rehab  6 MWT  551 ft = 45% pred (reduced)  1.7 METs  SpO2: 94% 4L    1.0  MPH   HR: 117bpm      RPD: 7, RPE: 9                                           Post Rehab   6 MWT  ft = % pred   METs  SpO2: %  MPH  HR:  bpm      RPD:  , RPE:                                        GOALS  List at least 2 patient specific goals    [x]Improve activity for tolerance for routine tasks and walking    [x]Learn proper breathing techniques including PLB    [x]Learn proper pacing with activities    []Learn proper use of oxygen, systems and safety    []Learn proper exercise guidelines    []Learn proper nutrition for my diagnosis    [x] Would like to be able to get around easier                              Learning Barrier(s)  [] Speech           [] Literacy              [] Hearing          [] Cognitive            [] Vision             [x]  Ready to Learn          Balance Issues  [x] Y  [] N  Occasionally loses balance for no reason. Orthopedic Issues  []  Y[x]  N        Rate your Physical   Fitness (PF)?    3/10    Handgrip:  Right:  Left:    EDUCATION  [x]  Staff Introduction  [x]  Proper setup/O2 use  [x]  Equipment Kirkwood'n  [x]  RPD/RPE Explain  [x]  SpO2/HR Explain  [x]  Breathing Retraining  [x]  Explain Intensity  [x] Initial Levels set GOALS    1. Not yet    2. Yes    3. Yes    4. time for TM, AB, NS, SF or Arm ergometer per session or when a steady state has occurred in RPE if  SpO2 and HR levels are acceptable           PHYSICIAN DIRECTED   EXERCISE RX       Titrate O2 to keep SpO2 >89%    Frequency (F): 2-3x/wk in Rehab, 1-3x/wk home walking       Intensity (I):  Initial + 5-10% increase each week or when a steady state has occurred in RPE if  SpO2 and HR levels are acceptable  Type (T)  Aerobic (TM,NS, SFR,SFS, AE, AB, RB), RT   8-16 reps    CAN USE ALL EQUIPMENT EXCEPT TM/Elliptical        Time (Ti): Aerobic:   30-40 min        Progression:   1-2 min total time for TM, AB, NS, SF or Arm ergometer per session or when a steady state has occurred in RPE if  SpO2 and HR levels are acceptable        Is pt. progressing in rehab?:  Yes-slowly             PHYSICIAN DIRECTED   EXERCISE RX       Titrate O2 to keep SpO2 >89%    Frequency (F): 2-3x/wk in Rehab, 1-3x/wk home walking       Intensity (I):  Initial + 5-10% increase each week when a steady state has occurred in RPE if  SpO2 and HR levels are acceptable  Type (T)  Aerobic (TM,NS, SFR,SFS, AE, AB, RB), RT   8-16 reps    CAN USE ALL EQUIPMENT EXCEPT        Time (Ti): Aerobic:   30-50 min     Progression:   1-2 min total time for TM, AB, NS, SF or Arm ergometer per session or when a steady state has occurred in RPE if  SpO2 and HR levels are acceptable              Is pt. progressing in rehab?:                 PHYSICIAN DIRECTED   EXERCISE RX       Titrate O2 to keep SpO2 >89%    Frequency (F): 2-3x/wk in Rehab, 1-3x/wk home walking       Intensity (I):  Initial + 5-10% increase each week when a steady state has occurred in RPE if  SpO2 and HR levels are acceptable  Type (T):   Aerobic (TM,NS, SFR,SFS, AE, AB, RB), RT   8-16 reps    CAN USE ALL EQUIPMENT EXCEPT          Time (Ti): Aerobic:   30-58 min         Progression:   1-2 min total time for TM, AB, NS, SF or Arm ergometer per session or when a steady state has occurred in RPE if  SpO2 and HR levels are acceptable            Is pt. progressing in rehab?:               Final Intensity (I): See below and final 6MWT above            ACHIEVED TOTAL AEROBIC EXERCISE TIME:  min         FINAL LEVELS:  [] TM: min  [] Nustep: level  min  [] Arm ergometer: mcpherson min  [] Scifit: level min  [] HW :  # x  reps  [] Dy:    X   reps  [] Cybex RT:    # x   Reps  [] Eliptical:level  X  Min  [] Arc: level   X    mins  [] RB:  Level  X   mins  [] AB: level   X     Min              Did pt. progress in rehab?:     30 DAY TARGET GOAL  [x] Start slowly but progress each week  [x] Increase duration on the equipment  [x] Start resistance training    -30 day PF goal: 4/10                 Current Home Exercise :none    Frequency:      Type:                     Health Knowledge   Test Score:  19/25     Current Home Exercise:   Frequency: Every other day     Type: Warmups, chair stands, and P.T.  Exercises         Time: 20-30  min                                  Current Home Exercise:   Frequency:       Type:         Time:  min                                Current Home Exercise:   Frequency:       Type:         Time:  min                                                      Discharge exercise plan                                  Repeat Health Knowledge Test Score :    /25     Yvonne's INDIVIDUAL TREATMENT PLAN  SMOKING AND   OTHER COMPONENTS    Smoking/Other  Components   Initial Assessment  Day 1 Smoking/Other  Components  Intervention  Day 2-30 Smoking/Other  Components  Re-Assessment  Day 31-60 Smoking/Other  Components  Re-Assessment Day 61-90+ Smoking/Other  Components  Discharge  Final Day   Tobacco Use Hx  [x] Y  [] N?:   Quit Date: Currently smoking  Cig/Day: 20  Years: 40  Tobacco Use  Any change in Smoking Status?:    []  not smoking  Quit Date:   (if applicable)  Intervention  []  Information/ed material given on cessation techniques  []  smoking cessation class schedule given Tobacco Use  Any change in Smoking Status?:  NO-smoking 6 cigs/day    Quit Date:   (if applicable)          Intervention  [x] Referral to Tobacco Cessation Specialist (TCS)    Smoking cessation info given and discussed tips/ setting quit date Tobacco Use  Any change in Smoking Status?:     Quit Date:   (if applicable)        Intervention Tobacco Use  Any change in Smoking Status?:     Quit Date:   (if applicable)          Intervention                             Tobacco Use  Any change in Smoking Status?:   Quit Date:   (if applicable)            Intervention  [] Pt used TCS counseling           Other  Components:    [x]  Environmental Factors    [x]  Prevention Management of Exacerbations    []  CHF Management    []  Hypertension Management     Intervention/  Education              Hospital Admission? Infection/exacerbation? Hospital Admission? Infection/exacerbation? Hospital Admission? Infection/exacerbation?                  # of hospital admissions    #of infection/exacerbations             Yvonne's INDIVIDUAL TREATMENT PLAN  OXYGEN AND MEDICATIONS    OXYGEN  MEDICATIONS   Initial Assessment  Day 1 OXYGEN  MEDICATIONS  Intervention  Day 2-30 OXYGEN  MEDICATION  Re-Assessment  Day 31-60 OXYGEN  MEDICATION  ReAssessment Day 61-90+ OXYGEN  MEDICATION  Discharge  Final Day                    Medications  [x]  Uses as prescribed  []  Non- compliant with prescribed meds    Respiratory Medications    []  Proper use   and technique of MDI, DPI and/or nebs  []  Incorrect use and technique of MDI, DPI and /or nebs    Hypoxemia  Problem:    [x]  No problem  [] Noncompliant with O2 use  []  Oxygen in MI only  []  No home O2  []  No portable O2  []  Needs O2 prescription and O2 qualifying data sent for setup   ()Poor knowledge of O2 use/safety    Current Oxygen Prescription:   3-4 l/min rest  3-4 l/min exercise   titration   plan/weaning plan:  CPAP/BIPAP:    Goals:     []  Manage Hypoxemia  []  receive adequate Assessment  Day 1 NUTRITION   Intervention  Day 2-30 NUTRITION   Re-Assessment  Day 31-60 NUTRITION   Re-Assessment Day 61-90+ NUTRITION Discharge  Final Day   Weight Management:  302# lbs    Ht: 5ft 9in  Current BMI 44.6 Weight Management:  304.5 lbs  Current BMI Weight Management:    lbs  Current BMI Weight Management:    lbs  Current BMI Weight Management:    lbs  Current BMI   Diabetes?: No  A1C 4.9 (3/19/18)  Fasting BS:   Insulin?:    Oral agent? Diabetes:  If y, has patient seen a positive trend in FBS?:      Intervention    [x] Basic nutrition education   [] Referral to Diabetes Education? [] Referral to weightloss/nutrition education     Intervention    [] Pt has had Nutrition class? [] Informal questions asked regarding nutrition/weight control Intervention    []  Pt has had Nutrition class? [] Questions addressed Intervention    []  Pt has had Nutrition class? Intervention    Pt aware of:     [] Pulmonary eating techniques   [] Smart choices, Calories   []Gas-producing foods   [] Wt gain / loss strategies     GOALS    Weight Loss         30 DAY TARGET GOAL:    [x] Decrease portion size by 250-500 calories/day  [x] Increase fluid intake to 6-8 8oz. [x] Eat less sweets      Reasonable, achievable 30 day weight goal:298 lbs   (1-2 lb per week is recommended)            Weight Gain        30 day   Target Goal:    [] increase proteins  [] add nutrition  Supplement  [] 6 small meals      Did pt meet Initial 30 day Target Goal(s)?:     New 30 DAY TARGET GOAL:    [] Decrease saturated fats  [] Decrease gas producing  cruciferous veggies   -  Reasonable, achievable 30 day weight goal:  301lbs    States she eats wrong food. Scheduled for nutrition class next Tues.    Did pt meet previous 30 day Target   Goal(s)?:     New 30 DAY TARGET GOAL:    [] Switch to whole grains whenever possible  [] Decrease/ Eliminate carbonated beverages    Reasonable, achievable 30 day weight goal:    Did pt meet previous 30 day PLAN  EDUCATION DOMAIN:    EDUCATION   Initial Assessment  Day 1 EDUCATION   Intervention  Day 2-30 EDUCATION   Re-Assessment  Day 31-60 EDUCATION   ReAssessment Day 61-90+ EDUCATION Discharge  Final Day                      Education  [x] Equipment/Staff Orientation  [x] Breathing Retraining  [] Importance of Clean Hands    Chronic Cough?:   [x] Y   []  N  Spacer?:   [x]   Y   []  N    Sleep Apnea?:  [x] Y    [] N     [x] Education Book given       Education  Individual instruction  [x] PLB  [x] RPD/RPE   [x] O2 use  []  review PFT  [] Inhalers   [x]Home Activity/Warm up/ stretches  Attend Classes:  [] Nutrition  [] Panic control, relaxation, managing depression  [] A&P of the Respiratory System   [] Environ. Issues+ Travel   [] Bronchial Hygiene / Preventing Infection  [] Resistance Training  [x]  Benefits of Exercise  [] Energy Cons        Education  Individual instruction  [] PLB  [] RPD/RPE   [] O2 use  []  review PFT  [] Inhalers   [] Home Activity/Warm up/ stretches  Attend Classes:  [] Nutrition  []  Panic conntrol, relaxation, managing depression  []  A&P of the Respiratory System   [] Environ. Issues+ Travel   [] Bronchial Hygiene / Preventing Infection  []  Resistance Training  [x] Benefits of Exercise  [] Energy Cons    Education  Individual instruction  [] PLB  [] RPD/RPE   []  O2 use  []  review PFT  [] Inhalers   [] Home Activity/Warm up/ stretches  Attend Classes:  [] Nutrition  []  Panic conntrol, relaxation, managing depression  [] A&P of the Respiratory System   [] Environ. Issues+ Travel   [] Bronchial Hygiene / Preventing Infection  []  Resistance Training  [x] Benefits of Exercise  [] Energy Cons   Education  []  Addressed pt questions on Education Topics                                                         Physician Interaction / Response:  (If none, continue on present care plan)     Physician Interaction / Response:   (If none, continue on present care plan)   Physician Interaction / Response:   (If none, continue on present care plan)   Physician Interaction / Response:   (If none, continue on present care plan)   Physician Interaction / Response:       Discharge the patient. Rehab Potential:  []  Good [] Fair [x] Poor    Summary: Ollie Favre is a 59 yr old female with a History of COPD who was referred to Pulmonary Rehab for shortness of breath and deconditioning. She presents today for her initial evaluation. She has been on oxygen @ 3L since Nov 2017 after a hospitalization for respiratory failure in which she was on the vent and had a tracheostomy placed for failure to wean. She spent some time in a rehab facility and was decannulated in January 2018. Currently she lives alone and continues to smoke despite her sob and oxygen requirements. She uses a rolling walker and reports that she is unable to get around much at all due to her shortness of breath and fatigue. She will attend 28 sessions of PA twice weekly. Ripon Cradle will exercise up to 45 min starting at low levels. Time and intensity to be increased based on RPE. Oxygen: She will exercise on 4L of oxygen as determined by 6 min walk test to maintain SaO2> 89%. Medications affecting HR: Albuterol HHN q 4hrs prn, Albuterol MDI 2 puffs q 4hrs prn, Metoprolol succ 12.5mg daily. Nutrition: Wt. 302#. [place] states her wt has gone up 20-30# over the past year. She admits to frequently snacking on chips and dip, and drinks koolaid. Basic healthy eating tips reviewed with her including substituting fruits and veggies for chips, cutting back on portion sized, and drinking water instead of koolaid. She will attend General Nutrition class during PA. Psychosocial including Dyspnea with ADL's, Depression/Anxiety and Social Functioning:Yvonne lives alone. She has one daughter who has a vision impairment and is unable to assist her much. She does not know much about her medications.  She has a visiting home health aid who Referring Physician:  Dr. Miles Macario                                             Fax #:    Reviewed and electronically signed by Dr Silvio Macario, Medical Director

## 2019-02-28 ENCOUNTER — HOSPITAL ENCOUNTER (OUTPATIENT)
Dept: CARDIAC REHAB | Age: 59
Setting detail: THERAPIES SERIES
Discharge: HOME | End: 2019-02-28
Payer: COMMERCIAL

## 2019-02-28 VITALS — BODY MASS INDEX: 46.19 KG/M2 | WEIGHT: 293 LBS

## 2019-02-28 PROCEDURE — G0424 PULMONARY REHAB W EXER: HCPCS

## 2019-03-05 ENCOUNTER — HOSPITAL ENCOUNTER (OUTPATIENT)
Dept: CARDIAC REHAB | Age: 59
Setting detail: THERAPIES SERIES
Discharge: HOME | End: 2019-03-05
Payer: COMMERCIAL

## 2019-03-05 VITALS — BODY MASS INDEX: 46.22 KG/M2 | WEIGHT: 293 LBS

## 2019-03-05 PROCEDURE — G0424 PULMONARY REHAB W EXER: HCPCS

## 2019-03-12 ENCOUNTER — APPOINTMENT (OUTPATIENT)
Dept: CARDIAC REHAB | Age: 59
End: 2019-03-12
Payer: COMMERCIAL

## 2019-03-14 ENCOUNTER — APPOINTMENT (OUTPATIENT)
Dept: CARDIAC REHAB | Age: 59
End: 2019-03-14
Payer: COMMERCIAL

## 2019-03-19 ENCOUNTER — HOSPITAL ENCOUNTER (OUTPATIENT)
Dept: CARDIAC REHAB | Age: 59
Setting detail: THERAPIES SERIES
Discharge: HOME | End: 2019-03-19
Payer: COMMERCIAL

## 2019-03-19 VITALS — WEIGHT: 293 LBS | BODY MASS INDEX: 45.98 KG/M2

## 2019-03-19 PROCEDURE — G0424 PULMONARY REHAB W EXER: HCPCS

## 2019-03-21 ENCOUNTER — APPOINTMENT (OUTPATIENT)
Dept: CARDIAC REHAB | Age: 59
End: 2019-03-21
Payer: COMMERCIAL

## 2019-03-28 ENCOUNTER — HOSPITAL ENCOUNTER (OUTPATIENT)
Dept: CARDIAC REHAB | Age: 59
Setting detail: THERAPIES SERIES
Discharge: HOME | End: 2019-03-28
Payer: COMMERCIAL

## 2019-03-28 VITALS — WEIGHT: 293 LBS | BODY MASS INDEX: 45.77 KG/M2

## 2019-03-28 PROCEDURE — G0424 PULMONARY REHAB W EXER: HCPCS

## 2019-04-02 ENCOUNTER — HOSPITAL ENCOUNTER (OUTPATIENT)
Dept: CARDIAC REHAB | Age: 59
Setting detail: THERAPIES SERIES
Discharge: HOME | End: 2019-04-02
Payer: COMMERCIAL

## 2019-04-02 VITALS — BODY MASS INDEX: 45.77 KG/M2 | WEIGHT: 293 LBS

## 2019-04-02 PROCEDURE — G0424 PULMONARY REHAB W EXER: HCPCS

## 2019-04-02 NOTE — FLOWSHEET NOTE
Pt states she took all her meds today and yesterday but missed them a couple of days over the weekend. States she took her inhaler just before coming in today as her chest felt tight. Pt states chest tightness improved after inhaler use. Attended education class on preventing and recognizing infection and when to call the doctor. Also explained bronchial hygiene techniques.  Session time= 2:15-3:50pm

## 2019-04-04 ENCOUNTER — HOSPITAL ENCOUNTER (OUTPATIENT)
Dept: CARDIAC REHAB | Age: 59
Setting detail: THERAPIES SERIES
Discharge: HOME | End: 2019-04-04
Payer: COMMERCIAL

## 2019-04-04 VITALS — BODY MASS INDEX: 45.93 KG/M2 | WEIGHT: 293 LBS

## 2019-04-04 PROCEDURE — G0424 PULMONARY REHAB W EXER: HCPCS

## 2019-04-09 ENCOUNTER — HOSPITAL ENCOUNTER (OUTPATIENT)
Dept: CARDIAC REHAB | Age: 59
Setting detail: THERAPIES SERIES
Discharge: HOME | End: 2019-04-09
Payer: COMMERCIAL

## 2019-04-09 VITALS — BODY MASS INDEX: 45.75 KG/M2 | WEIGHT: 293 LBS

## 2019-04-09 LAB
GLUCOSE BLD-MCNC: 98 MG/DL (ref 70–99)
PERFORMED ON: NORMAL

## 2019-04-09 PROCEDURE — G0424 PULMONARY REHAB W EXER: HCPCS

## 2019-04-10 ENCOUNTER — TELEPHONE (OUTPATIENT)
Dept: CARDIOLOGY CLINIC | Age: 59
End: 2019-04-10

## 2019-04-10 NOTE — LETTER
[facility]  [address]  Phone: [phone]  Fax: [phone]    Elvie Hannon MD        April 15, 2019    [address]      Dear Sanket Loomis:    Our office has been trying to contact you with no success. We wanted ti inform you of a change in your medication for Metoprolol. Dr. Clare Castaneda wants to increase this medication to 50 mg daily. If you have any questions or concerns, please don't hesitate to call.     Sincerely,        Elvie Hannon MD

## 2019-04-10 NOTE — TELEPHONE ENCOUNTER
Left message on pt's v/m to call office. Wanted to let pt know that Dr. Koki Moeller wants pt to increase pt's Metoprolol to 50 mg daily. Info with Dr. uSdhir Clayton also faxed to [facility].

## 2019-04-11 ENCOUNTER — HOSPITAL ENCOUNTER (OUTPATIENT)
Dept: CARDIAC REHAB | Age: 59
Setting detail: THERAPIES SERIES
Discharge: HOME | End: 2019-04-11
Payer: COMMERCIAL

## 2019-04-11 PROCEDURE — G0424 PULMONARY REHAB W EXER: HCPCS

## 2019-04-11 NOTE — TELEPHONE ENCOUNTER
Tried calling pt again. Pt answered and in the middle of giving an medication update call was disconnected. Unable to reach pt afterward. Will try again later.

## 2019-04-11 NOTE — PROGRESS NOTES
Pt attended education class on proper nutrition for those with pulmonary disease. Could not complete exercise today.

## 2019-04-16 ENCOUNTER — HOSPITAL ENCOUNTER (OUTPATIENT)
Dept: CARDIAC REHAB | Age: 59
Setting detail: THERAPIES SERIES
End: 2019-04-16
Payer: COMMERCIAL

## 2019-04-16 ENCOUNTER — HOSPITAL ENCOUNTER (INPATIENT)
Age: 59
LOS: 6 days | Discharge: HOME | DRG: 189 | End: 2019-04-22
Attending: EMERGENCY MEDICINE | Admitting: INTERNAL MEDICINE
Payer: COMMERCIAL

## 2019-04-16 ENCOUNTER — APPOINTMENT (OUTPATIENT)
Dept: GENERAL RADIOLOGY | Age: 59
DRG: 189 | End: 2019-04-16
Payer: COMMERCIAL

## 2019-04-16 DIAGNOSIS — R06.02 SHORTNESS OF BREATH: ICD-10-CM

## 2019-04-16 DIAGNOSIS — J44.1 COPD EXACERBATION (HCC): ICD-10-CM

## 2019-04-16 DIAGNOSIS — R09.02 HYPOXIA: Primary | ICD-10-CM

## 2019-04-16 LAB
A/G RATIO: 1 (ref 1.1–2.2)
ALBUMIN SERPL-MCNC: 3.5 G/DL (ref 3.4–5)
ALP BLD-CCNC: 84 U/L (ref 40–129)
ALT SERPL-CCNC: 8 U/L (ref 10–40)
ANION GAP SERPL CALCULATED.3IONS-SCNC: 10 MMOL/L (ref 3–16)
AST SERPL-CCNC: 19 U/L (ref 15–37)
BASE EXCESS VENOUS: 5.7 MMOL/L
BASOPHILS ABSOLUTE: 0.1 K/UL (ref 0–0.2)
BASOPHILS RELATIVE PERCENT: 1 %
BILIRUB SERPL-MCNC: 0.3 MG/DL (ref 0–1)
BUN BLDV-MCNC: 11 MG/DL (ref 7–20)
CALCIUM SERPL-MCNC: 9.7 MG/DL (ref 8.3–10.6)
CARBOXYHEMOGLOBIN: 9.4 %
CHLORIDE BLD-SCNC: 96 MMOL/L (ref 99–110)
CO2: 30 MMOL/L (ref 21–32)
CREAT SERPL-MCNC: 0.5 MG/DL (ref 0.6–1.2)
EOSINOPHILS ABSOLUTE: 0 K/UL (ref 0–0.6)
EOSINOPHILS RELATIVE PERCENT: 0.3 %
GFR AFRICAN AMERICAN: >60
GFR NON-AFRICAN AMERICAN: >60
GLOBULIN: 3.6 G/DL
GLUCOSE BLD-MCNC: 83 MG/DL (ref 70–99)
HCO3 VENOUS: 29 MMOL/L (ref 23–29)
HCT VFR BLD CALC: 40.4 % (ref 36–48)
HEMOGLOBIN: 12.8 G/DL (ref 12–16)
LACTIC ACID: 0.8 MMOL/L (ref 0.4–2)
LYMPHOCYTES ABSOLUTE: 1.7 K/UL (ref 1–5.1)
LYMPHOCYTES RELATIVE PERCENT: 22.3 %
MCH RBC QN AUTO: 26.1 PG (ref 26–34)
MCHC RBC AUTO-ENTMCNC: 31.6 G/DL (ref 31–36)
MCV RBC AUTO: 82.4 FL (ref 80–100)
METHEMOGLOBIN VENOUS: 0.9 %
MONOCYTES ABSOLUTE: 0.7 K/UL (ref 0–1.3)
MONOCYTES RELATIVE PERCENT: 8.7 %
NEUTROPHILS ABSOLUTE: 5.2 K/UL (ref 1.7–7.7)
NEUTROPHILS RELATIVE PERCENT: 67.7 %
O2 CONTENT, VEN: 18 ML/DL
O2 SAT, VEN: 100 %
O2 THERAPY: ABNORMAL
PCO2, VEN: 36 MMHG (ref 40–50)
PDW BLD-RTO: 17.6 % (ref 12.4–15.4)
PH VENOUS: 7.51 (ref 7.35–7.45)
PLATELET # BLD: 137 K/UL (ref 135–450)
PMV BLD AUTO: 9.5 FL (ref 5–10.5)
PO2, VEN: 159 MMHG
POTASSIUM SERPL-SCNC: 5.3 MMOL/L (ref 3.5–5.1)
PRO-BNP: 791 PG/ML (ref 0–124)
RAPID INFLUENZA  B AGN: NEGATIVE
RAPID INFLUENZA A AGN: NEGATIVE
RBC # BLD: 4.9 M/UL (ref 4–5.2)
SODIUM BLD-SCNC: 136 MMOL/L (ref 136–145)
TCO2 CALC VENOUS: 30 MMOL/L
TOTAL PROTEIN: 7.1 G/DL (ref 6.4–8.2)
TROPONIN: <0.01 NG/ML
WBC # BLD: 7.6 K/UL (ref 4–11)

## 2019-04-16 PROCEDURE — 93005 ELECTROCARDIOGRAM TRACING: CPT | Performed by: PHYSICIAN ASSISTANT

## 2019-04-16 PROCEDURE — 96375 TX/PRO/DX INJ NEW DRUG ADDON: CPT

## 2019-04-16 PROCEDURE — 83605 ASSAY OF LACTIC ACID: CPT

## 2019-04-16 PROCEDURE — 82803 BLOOD GASES ANY COMBINATION: CPT

## 2019-04-16 PROCEDURE — 85025 COMPLETE CBC W/AUTO DIFF WBC: CPT

## 2019-04-16 PROCEDURE — 6360000002 HC RX W HCPCS: Performed by: EMERGENCY MEDICINE

## 2019-04-16 PROCEDURE — 84484 ASSAY OF TROPONIN QUANT: CPT

## 2019-04-16 PROCEDURE — 71046 X-RAY EXAM CHEST 2 VIEWS: CPT

## 2019-04-16 PROCEDURE — 87804 INFLUENZA ASSAY W/OPTIC: CPT

## 2019-04-16 PROCEDURE — 6370000000 HC RX 637 (ALT 250 FOR IP): Performed by: EMERGENCY MEDICINE

## 2019-04-16 PROCEDURE — 1200000000 HC SEMI PRIVATE

## 2019-04-16 PROCEDURE — 6360000002 HC RX W HCPCS: Performed by: PHYSICIAN ASSISTANT

## 2019-04-16 PROCEDURE — 96374 THER/PROPH/DIAG INJ IV PUSH: CPT

## 2019-04-16 PROCEDURE — 87040 BLOOD CULTURE FOR BACTERIA: CPT

## 2019-04-16 PROCEDURE — 94640 AIRWAY INHALATION TREATMENT: CPT

## 2019-04-16 PROCEDURE — 94762 N-INVAS EAR/PLS OXIMTRY CONT: CPT

## 2019-04-16 PROCEDURE — 83880 ASSAY OF NATRIURETIC PEPTIDE: CPT

## 2019-04-16 PROCEDURE — 99285 EMERGENCY DEPT VISIT HI MDM: CPT

## 2019-04-16 PROCEDURE — 2700000000 HC OXYGEN THERAPY PER DAY

## 2019-04-16 PROCEDURE — 80053 COMPREHEN METABOLIC PANEL: CPT

## 2019-04-16 PROCEDURE — 36415 COLL VENOUS BLD VENIPUNCTURE: CPT

## 2019-04-16 PROCEDURE — 6370000000 HC RX 637 (ALT 250 FOR IP): Performed by: PHYSICIAN ASSISTANT

## 2019-04-16 RX ORDER — ACETAMINOPHEN 325 MG/1
650 TABLET ORAL EVERY 4 HOURS PRN
Status: DISCONTINUED | OUTPATIENT
Start: 2019-04-16 | End: 2019-04-17

## 2019-04-16 RX ORDER — TORSEMIDE 20 MG/1
10 TABLET ORAL DAILY
Status: DISCONTINUED | OUTPATIENT
Start: 2019-04-17 | End: 2019-04-22 | Stop reason: HOSPADM

## 2019-04-16 RX ORDER — CLONAZEPAM 0.5 MG/1
0.5 TABLET ORAL 2 TIMES DAILY PRN
Status: DISCONTINUED | OUTPATIENT
Start: 2019-04-16 | End: 2019-04-22 | Stop reason: HOSPADM

## 2019-04-16 RX ORDER — SODIUM CHLORIDE 0.9 % (FLUSH) 0.9 %
10 SYRINGE (ML) INJECTION PRN
Status: DISCONTINUED | OUTPATIENT
Start: 2019-04-16 | End: 2019-04-22 | Stop reason: HOSPADM

## 2019-04-16 RX ORDER — NICOTINE 21 MG/24HR
1 PATCH, TRANSDERMAL 24 HOURS TRANSDERMAL DAILY
Status: DISCONTINUED | OUTPATIENT
Start: 2019-04-17 | End: 2019-04-22 | Stop reason: HOSPADM

## 2019-04-16 RX ORDER — ARIPIPRAZOLE 15 MG/1
15 TABLET ORAL DAILY
Status: DISCONTINUED | OUTPATIENT
Start: 2019-04-17 | End: 2019-04-22 | Stop reason: HOSPADM

## 2019-04-16 RX ORDER — ALBUTEROL SULFATE 90 UG/1
2 AEROSOL, METERED RESPIRATORY (INHALATION) EVERY 6 HOURS PRN
Status: DISCONTINUED | OUTPATIENT
Start: 2019-04-16 | End: 2019-04-22 | Stop reason: HOSPADM

## 2019-04-16 RX ORDER — FAMOTIDINE 20 MG/1
20 TABLET, FILM COATED ORAL 2 TIMES DAILY
Status: DISCONTINUED | OUTPATIENT
Start: 2019-04-16 | End: 2019-04-22 | Stop reason: HOSPADM

## 2019-04-16 RX ORDER — ASPIRIN 81 MG/1
81 TABLET ORAL DAILY
Status: DISCONTINUED | OUTPATIENT
Start: 2019-04-17 | End: 2019-04-22 | Stop reason: HOSPADM

## 2019-04-16 RX ORDER — METHYLPREDNISOLONE SODIUM SUCCINATE 125 MG/2ML
125 INJECTION, POWDER, LYOPHILIZED, FOR SOLUTION INTRAMUSCULAR; INTRAVENOUS ONCE
Status: COMPLETED | OUTPATIENT
Start: 2019-04-16 | End: 2019-04-16

## 2019-04-16 RX ORDER — ESCITALOPRAM OXALATE 10 MG/1
10 TABLET ORAL DAILY
Status: DISCONTINUED | OUTPATIENT
Start: 2019-04-17 | End: 2019-04-22 | Stop reason: HOSPADM

## 2019-04-16 RX ORDER — DIVALPROEX SODIUM 500 MG/1
500 TABLET, DELAYED RELEASE ORAL EVERY 8 HOURS SCHEDULED
Status: DISCONTINUED | OUTPATIENT
Start: 2019-04-16 | End: 2019-04-22 | Stop reason: HOSPADM

## 2019-04-16 RX ORDER — METOPROLOL SUCCINATE 25 MG/1
12.5 TABLET, EXTENDED RELEASE ORAL DAILY
Status: DISCONTINUED | OUTPATIENT
Start: 2019-04-17 | End: 2019-04-22 | Stop reason: HOSPADM

## 2019-04-16 RX ORDER — SODIUM CHLORIDE 0.9 % (FLUSH) 0.9 %
10 SYRINGE (ML) INJECTION EVERY 12 HOURS SCHEDULED
Status: DISCONTINUED | OUTPATIENT
Start: 2019-04-16 | End: 2019-04-22 | Stop reason: HOSPADM

## 2019-04-16 RX ORDER — IPRATROPIUM BROMIDE AND ALBUTEROL SULFATE 2.5; .5 MG/3ML; MG/3ML
1 SOLUTION RESPIRATORY (INHALATION) ONCE
Status: COMPLETED | OUTPATIENT
Start: 2019-04-16 | End: 2019-04-16

## 2019-04-16 RX ORDER — QUETIAPINE FUMARATE 200 MG/1
200 TABLET, FILM COATED ORAL NIGHTLY
Status: DISCONTINUED | OUTPATIENT
Start: 2019-04-16 | End: 2019-04-22 | Stop reason: HOSPADM

## 2019-04-16 RX ORDER — PREDNISONE 20 MG/1
40 TABLET ORAL DAILY
Status: DISCONTINUED | OUTPATIENT
Start: 2019-04-17 | End: 2019-04-22 | Stop reason: HOSPADM

## 2019-04-16 RX ORDER — IPRATROPIUM BROMIDE AND ALBUTEROL SULFATE 2.5; .5 MG/3ML; MG/3ML
1 SOLUTION RESPIRATORY (INHALATION)
Status: DISCONTINUED | OUTPATIENT
Start: 2019-04-17 | End: 2019-04-22 | Stop reason: HOSPADM

## 2019-04-16 RX ORDER — ASCORBIC ACID 500 MG
500 TABLET ORAL 2 TIMES DAILY
Status: DISCONTINUED | OUTPATIENT
Start: 2019-04-16 | End: 2019-04-22 | Stop reason: HOSPADM

## 2019-04-16 RX ORDER — FERROUS SULFATE TAB EC 324 MG (65 MG FE EQUIVALENT) 324 (65 FE) MG
324 TABLET DELAYED RESPONSE ORAL
Status: DISCONTINUED | OUTPATIENT
Start: 2019-04-17 | End: 2019-04-22 | Stop reason: HOSPADM

## 2019-04-16 RX ORDER — ONDANSETRON 2 MG/ML
4 INJECTION INTRAMUSCULAR; INTRAVENOUS EVERY 6 HOURS PRN
Status: DISCONTINUED | OUTPATIENT
Start: 2019-04-16 | End: 2019-04-22 | Stop reason: HOSPADM

## 2019-04-16 RX ORDER — FUROSEMIDE 10 MG/ML
40 INJECTION INTRAMUSCULAR; INTRAVENOUS ONCE
Status: COMPLETED | OUTPATIENT
Start: 2019-04-16 | End: 2019-04-16

## 2019-04-16 RX ADMIN — IPRATROPIUM BROMIDE AND ALBUTEROL SULFATE 1 AMPULE: .5; 3 SOLUTION RESPIRATORY (INHALATION) at 21:05

## 2019-04-16 RX ADMIN — IPRATROPIUM BROMIDE AND ALBUTEROL SULFATE 1 AMPULE: .5; 3 SOLUTION RESPIRATORY (INHALATION) at 18:22

## 2019-04-16 RX ADMIN — FUROSEMIDE 40 MG: 10 INJECTION, SOLUTION INTRAMUSCULAR; INTRAVENOUS at 20:45

## 2019-04-16 RX ADMIN — METHYLPREDNISOLONE SODIUM SUCCINATE 125 MG: 125 INJECTION, POWDER, FOR SOLUTION INTRAMUSCULAR; INTRAVENOUS at 19:21

## 2019-04-16 ASSESSMENT — ENCOUNTER SYMPTOMS
VOMITING: 1
NAUSEA: 1
ABDOMINAL PAIN: 0
SHORTNESS OF BREATH: 1
COUGH: 1

## 2019-04-16 ASSESSMENT — PAIN SCALES - GENERAL
PAINLEVEL_OUTOF10: 4
PAINLEVEL_OUTOF10: 4
PAINLEVEL_OUTOF10: 3

## 2019-04-16 ASSESSMENT — PAIN DESCRIPTION - DESCRIPTORS
DESCRIPTORS: ACHING
DESCRIPTORS: ACHING

## 2019-04-16 ASSESSMENT — PAIN - FUNCTIONAL ASSESSMENT: PAIN_FUNCTIONAL_ASSESSMENT: ACTIVITIES ARE NOT PREVENTED

## 2019-04-16 ASSESSMENT — PAIN DESCRIPTION - PAIN TYPE
TYPE: ACUTE PAIN
TYPE: ACUTE PAIN

## 2019-04-16 ASSESSMENT — PAIN DESCRIPTION - ONSET: ONSET: ON-GOING

## 2019-04-16 ASSESSMENT — PAIN DESCRIPTION - FREQUENCY: FREQUENCY: CONTINUOUS

## 2019-04-16 ASSESSMENT — PAIN DESCRIPTION - LOCATION
LOCATION: BACK;CHEST;THROAT
LOCATION: THROAT;BACK;CHEST

## 2019-04-16 ASSESSMENT — PAIN DESCRIPTION - PROGRESSION: CLINICAL_PROGRESSION: NOT CHANGED

## 2019-04-16 ASSESSMENT — PAIN DESCRIPTION - ORIENTATION: ORIENTATION: INNER

## 2019-04-16 NOTE — ED NOTES
Report received from off going RN Oziel Soriano.  Pain assessment completed as documented.       Nelson Peoples RN  04/16/19 [id number]

## 2019-04-17 LAB
ANION GAP SERPL CALCULATED.3IONS-SCNC: 11 MMOL/L (ref 3–16)
BUN BLDV-MCNC: 12 MG/DL (ref 7–20)
CALCIUM SERPL-MCNC: 9.9 MG/DL (ref 8.3–10.6)
CHLORIDE BLD-SCNC: 97 MMOL/L (ref 99–110)
CO2: 32 MMOL/L (ref 21–32)
CREAT SERPL-MCNC: 0.6 MG/DL (ref 0.6–1.2)
EKG ATRIAL RATE: 112 BPM
EKG DIAGNOSIS: NORMAL
EKG P AXIS: 76 DEGREES
EKG P-R INTERVAL: 168 MS
EKG Q-T INTERVAL: 346 MS
EKG QRS DURATION: 72 MS
EKG QTC CALCULATION (BAZETT): 472 MS
EKG R AXIS: 88 DEGREES
EKG T AXIS: 46 DEGREES
EKG VENTRICULAR RATE: 112 BPM
GFR AFRICAN AMERICAN: >60
GFR NON-AFRICAN AMERICAN: >60
GLUCOSE BLD-MCNC: 118 MG/DL (ref 70–99)
GLUCOSE BLD-MCNC: 119 MG/DL (ref 70–99)
GLUCOSE BLD-MCNC: 144 MG/DL (ref 70–99)
GLUCOSE BLD-MCNC: 168 MG/DL (ref 70–99)
ORGANISM: ABNORMAL
PERFORMED ON: ABNORMAL
POTASSIUM REFLEX MAGNESIUM: 5 MMOL/L (ref 3.5–5.1)
PROCALCITONIN: 0.09 NG/ML (ref 0–0.15)
REPORT: NORMAL
RESPIRATORY PANEL PCR: ABNORMAL
RESPIRATORY PANEL PCR: ABNORMAL
SODIUM BLD-SCNC: 140 MMOL/L (ref 136–145)

## 2019-04-17 PROCEDURE — 94761 N-INVAS EAR/PLS OXIMETRY MLT: CPT

## 2019-04-17 PROCEDURE — 80048 BASIC METABOLIC PNL TOTAL CA: CPT

## 2019-04-17 PROCEDURE — 87798 DETECT AGENT NOS DNA AMP: CPT

## 2019-04-17 PROCEDURE — 6370000000 HC RX 637 (ALT 250 FOR IP): Performed by: NURSE PRACTITIONER

## 2019-04-17 PROCEDURE — 87633 RESP VIRUS 12-25 TARGETS: CPT

## 2019-04-17 PROCEDURE — 6360000002 HC RX W HCPCS: Performed by: INTERNAL MEDICINE

## 2019-04-17 PROCEDURE — 87040 BLOOD CULTURE FOR BACTERIA: CPT

## 2019-04-17 PROCEDURE — 6370000000 HC RX 637 (ALT 250 FOR IP): Performed by: INTERNAL MEDICINE

## 2019-04-17 PROCEDURE — 84145 PROCALCITONIN (PCT): CPT

## 2019-04-17 PROCEDURE — 1200000000 HC SEMI PRIVATE

## 2019-04-17 PROCEDURE — 36415 COLL VENOUS BLD VENIPUNCTURE: CPT

## 2019-04-17 PROCEDURE — 94664 DEMO&/EVAL PT USE INHALER: CPT

## 2019-04-17 PROCEDURE — 94640 AIRWAY INHALATION TREATMENT: CPT

## 2019-04-17 PROCEDURE — 87486 CHLMYD PNEUM DNA AMP PROBE: CPT

## 2019-04-17 PROCEDURE — 2700000000 HC OXYGEN THERAPY PER DAY

## 2019-04-17 PROCEDURE — 93010 ELECTROCARDIOGRAM REPORT: CPT | Performed by: INTERNAL MEDICINE

## 2019-04-17 PROCEDURE — 2580000003 HC RX 258: Performed by: INTERNAL MEDICINE

## 2019-04-17 PROCEDURE — 87581 M.PNEUMON DNA AMP PROBE: CPT

## 2019-04-17 RX ORDER — ACETAMINOPHEN 325 MG/1
650 TABLET ORAL EVERY 4 HOURS PRN
Status: DISCONTINUED | OUTPATIENT
Start: 2019-04-17 | End: 2019-04-22 | Stop reason: HOSPADM

## 2019-04-17 RX ORDER — CETIRIZINE HYDROCHLORIDE 10 MG/1
5 TABLET ORAL DAILY
Status: DISCONTINUED | OUTPATIENT
Start: 2019-04-17 | End: 2019-04-22 | Stop reason: HOSPADM

## 2019-04-17 RX ORDER — GUAIFENESIN 600 MG/1
600 TABLET, EXTENDED RELEASE ORAL 2 TIMES DAILY
Status: DISCONTINUED | OUTPATIENT
Start: 2019-04-17 | End: 2019-04-22 | Stop reason: HOSPADM

## 2019-04-17 RX ORDER — SPIRONOLACTONE 25 MG/1
25 TABLET ORAL DAILY
Status: DISCONTINUED | OUTPATIENT
Start: 2019-04-17 | End: 2019-04-22 | Stop reason: HOSPADM

## 2019-04-17 RX ORDER — LISINOPRIL 10 MG/1
10 TABLET ORAL DAILY
Status: DISCONTINUED | OUTPATIENT
Start: 2019-04-17 | End: 2019-04-22 | Stop reason: HOSPADM

## 2019-04-17 RX ADMIN — FERROUS SULFATE TAB EC 324 MG (65 MG FE EQUIVALENT) 324 MG: 324 (65 FE) TABLET DELAYED RESPONSE at 08:23

## 2019-04-17 RX ADMIN — CETIRIZINE HYDROCHLORIDE 5 MG: 10 TABLET, FILM COATED ORAL at 16:50

## 2019-04-17 RX ADMIN — IPRATROPIUM BROMIDE AND ALBUTEROL SULFATE 1 AMPULE: .5; 3 SOLUTION RESPIRATORY (INHALATION) at 08:52

## 2019-04-17 RX ADMIN — FAMOTIDINE 20 MG: 20 TABLET ORAL at 00:31

## 2019-04-17 RX ADMIN — Medication 10 ML: at 00:31

## 2019-04-17 RX ADMIN — ASPIRIN 81 MG: 81 TABLET, COATED ORAL at 08:23

## 2019-04-17 RX ADMIN — MOMETASONE FUROATE AND FORMOTEROL FUMARATE DIHYDRATE 2 PUFF: 200; 5 AEROSOL RESPIRATORY (INHALATION) at 20:34

## 2019-04-17 RX ADMIN — DIVALPROEX SODIUM 500 MG: 500 TABLET, DELAYED RELEASE ORAL at 00:31

## 2019-04-17 RX ADMIN — TORSEMIDE 10 MG: 20 TABLET ORAL at 08:23

## 2019-04-17 RX ADMIN — FAMOTIDINE 20 MG: 20 TABLET ORAL at 21:06

## 2019-04-17 RX ADMIN — Medication 10 ML: at 08:24

## 2019-04-17 RX ADMIN — IPRATROPIUM BROMIDE AND ALBUTEROL SULFATE 1 AMPULE: .5; 3 SOLUTION RESPIRATORY (INHALATION) at 20:34

## 2019-04-17 RX ADMIN — GUAIFENESIN 600 MG: 600 TABLET, EXTENDED RELEASE ORAL at 21:06

## 2019-04-17 RX ADMIN — LISINOPRIL 10 MG: 10 TABLET ORAL at 16:50

## 2019-04-17 RX ADMIN — OXYCODONE HYDROCHLORIDE AND ACETAMINOPHEN 500 MG: 500 TABLET ORAL at 08:23

## 2019-04-17 RX ADMIN — DIVALPROEX SODIUM 500 MG: 500 TABLET, DELAYED RELEASE ORAL at 05:51

## 2019-04-17 RX ADMIN — PREDNISONE 40 MG: 20 TABLET ORAL at 08:23

## 2019-04-17 RX ADMIN — FAMOTIDINE 20 MG: 20 TABLET ORAL at 08:23

## 2019-04-17 RX ADMIN — SPIRONOLACTONE 25 MG: 25 TABLET ORAL at 16:50

## 2019-04-17 RX ADMIN — ENOXAPARIN SODIUM 40 MG: 40 INJECTION SUBCUTANEOUS at 08:22

## 2019-04-17 RX ADMIN — QUETIAPINE FUMARATE 200 MG: 200 TABLET ORAL at 21:06

## 2019-04-17 RX ADMIN — ACETAMINOPHEN 650 MG: 325 TABLET ORAL at 18:46

## 2019-04-17 RX ADMIN — ESCITALOPRAM OXALATE 10 MG: 10 TABLET ORAL at 08:23

## 2019-04-17 RX ADMIN — DIVALPROEX SODIUM 500 MG: 500 TABLET, DELAYED RELEASE ORAL at 13:51

## 2019-04-17 RX ADMIN — METOPROLOL SUCCINATE 12.5 MG: 25 TABLET, EXTENDED RELEASE ORAL at 08:23

## 2019-04-17 RX ADMIN — DIVALPROEX SODIUM 500 MG: 500 TABLET, DELAYED RELEASE ORAL at 21:06

## 2019-04-17 RX ADMIN — ARIPIPRAZOLE 15 MG: 15 TABLET ORAL at 08:23

## 2019-04-17 RX ADMIN — OXYCODONE HYDROCHLORIDE AND ACETAMINOPHEN 500 MG: 500 TABLET ORAL at 21:06

## 2019-04-17 RX ADMIN — QUETIAPINE FUMARATE 200 MG: 200 TABLET ORAL at 00:31

## 2019-04-17 RX ADMIN — OXYCODONE HYDROCHLORIDE AND ACETAMINOPHEN 500 MG: 500 TABLET ORAL at 00:31

## 2019-04-17 RX ADMIN — ONDANSETRON 4 MG: 2 INJECTION INTRAMUSCULAR; INTRAVENOUS at 18:46

## 2019-04-17 RX ADMIN — IPRATROPIUM BROMIDE AND ALBUTEROL SULFATE 1 AMPULE: .5; 3 SOLUTION RESPIRATORY (INHALATION) at 16:03

## 2019-04-17 RX ADMIN — IPRATROPIUM BROMIDE AND ALBUTEROL SULFATE 1 AMPULE: .5; 3 SOLUTION RESPIRATORY (INHALATION) at 12:48

## 2019-04-17 RX ADMIN — Medication 10 ML: at 21:07

## 2019-04-17 RX ADMIN — GUAIFENESIN 600 MG: 600 TABLET, EXTENDED RELEASE ORAL at 13:51

## 2019-04-17 ASSESSMENT — PAIN SCALES - GENERAL
PAINLEVEL_OUTOF10: 3
PAINLEVEL_OUTOF10: 2
PAINLEVEL_OUTOF10: 0

## 2019-04-17 ASSESSMENT — PAIN DESCRIPTION - PROGRESSION
CLINICAL_PROGRESSION: NOT CHANGED

## 2019-04-17 ASSESSMENT — PAIN DESCRIPTION - ORIENTATION: ORIENTATION: INNER

## 2019-04-17 ASSESSMENT — PAIN DESCRIPTION - DESCRIPTORS: DESCRIPTORS: ACHING

## 2019-04-17 ASSESSMENT — PAIN DESCRIPTION - FREQUENCY: FREQUENCY: INTERMITTENT

## 2019-04-17 ASSESSMENT — PAIN DESCRIPTION - PAIN TYPE: TYPE: ACUTE PAIN

## 2019-04-17 ASSESSMENT — PAIN DESCRIPTION - LOCATION: LOCATION: BACK;CHEST

## 2019-04-17 ASSESSMENT — PAIN DESCRIPTION - ONSET: ONSET: ON-GOING

## 2019-04-17 NOTE — PROGRESS NOTES
chloride flush  10 mL Intravenous 2 times per day    enoxaparin  40 mg Subcutaneous Daily    famotidine  20 mg Oral BID    nicotine  1 patch Transdermal Daily    ipratropium-albuterol  1 ampule Inhalation Q4H WA    predniSONE  40 mg Oral Daily     Continuous Infusions:  PRN Meds:.albuterol sulfate HFA, clonazePAM, sodium chloride flush, magnesium hydroxide, ondansetron, acetaminophen    PHYSICAL EXAM:  /81   Pulse 107   Temp 97.6 °F (36.4 °C) (Axillary)   Resp 16   Ht 5' 9\" (1.753 m)   Wt 292 lb 8.8 oz (132.7 kg)   SpO2 93%   BMI 43.20 kg/m²       Intake/Output Summary (Last 24 hours) at 4/17/2019 1624  Last data filed at 4/17/2019 0858  Gross per 24 hour   Intake 240 ml   Output --   Net 240 ml       General: Alert and oriented. Resting in bed in no apparent distress. obese  HEENT: Normocephalic. Atraumatic. Pupils equal and reactive. EOM intact. Oral mucosa pink/moist/intact. Neck: Supple. Symmetrical. Trachea midline. Lungs: rhonchi with exp wheezing, Respirations even and unlabored. Chest: Exam unremarkable. Cardiac: S1/S2 noted. Regular Rhythm and rate. Abdomen/GI: Soft. Non-tender. Non-distended. BS+. Extremities: PP+. Atraumatic. No redness/cyanosis/edema noted. Brisk cap refill. Skin: Dry and intact. No lesions noted. Neuro: Grossly intact. No focal deficits noted.      LABS:    Lab Results   Component Value Date    WBC 7.6 04/16/2019    HGB 12.8 04/16/2019    HCT 40.4 04/16/2019    MCV 82.4 04/16/2019     04/16/2019    LYMPHOPCT 22.3 04/16/2019    RBC 4.90 04/16/2019    MCH 26.1 04/16/2019    MCHC 31.6 04/16/2019    RDW 17.6 (H) 04/16/2019       Lab Results   Component Value Date    CREATININE 0.6 04/17/2019    BUN 12 04/17/2019     04/17/2019    K 5.0 04/17/2019    CL 97 (L) 04/17/2019    CO2 32 04/17/2019       Lab Results   Component Value Date    MG 2.00 02/03/2017       Lab Results   Component Value Date    ALT 8 (L) 04/16/2019    AST 19 04/16/2019    ALKPHOS 84 04/16/2019    BILITOT 0.3 04/16/2019        No flowsheet data found. Imaging:  XR CHEST STANDARD (2 VW)   Final Result   1. Cardiomegaly and pulmonary edema with small left pleural effusion   suggesting CHF. 2. Patchy bibasilar opacities may be atelectasis or infiltrates. Assessment & Plan:      COPD exacerbation  - likely secondary to [place]  - continue prednisone  - Continue scheduled DuoNeb's  - Added Mucinex  - Flutter and incentive spirometer  - Will need to be referred to the wellness Center at discharge  - Continue home meds    Chronic hypoxic respiratory failure  - Continue oxygen  - Wean To home dose of 3-4 L    Essential hypertension  - Lisinopril    Hyperlipidemia, unspecified    Schizophrenia  - on seroquel, depakote, abilitfy    Obese  - BMI 43    Body mass index is 43.2 kg/m². The patient and / or the family were informed of the results of any tests, a time was given to answer questions, a plan was proposed and they agreed with plan.     DVT prophylaxis: [x] Lovenox  [] SQ Heparin  [] SCDs because of  [] warfarin/oral direct thrombin inhibitor [] Encourage ambulation    GI prophylaxis: [] PPI/[id number]  [x] not indicated    Probiotic if on abx: [] Yes [] No [x] Not Indicated    Diet: DIET CARDIAC;    Consults:  IP CONSULT TO SPIRITUAL SERVICES  IP CONSULT TO SOCIAL WORK    Disposition:  [] Home  [] Home with home health [] Rehab [] Psych [] SNF  [] LTAC  [] Long term nursing home or group home [] Transfer to ICU  [] Transfer to PCU [] Other:    Code Status: Full Code    ELOS: tbd      ANNE MARIE Gan CNP  04/17/19

## 2019-04-17 NOTE — PROGRESS NOTES
Pt refused bath today, offered twice and pt stated she does not feel well to do so. RN aware.     Ama Forde, PCA

## 2019-04-17 NOTE — FLOWSHEET NOTE
04/17/19 1420   Encounter Summary   Services provided to: Patient   Referral/Consult From: Nurse   Support System Unknown   Contact [employer]   (pt on phn, left AD docs w/pt per pt request 4/17 CL)   Complexity of Encounter Low   Length of Encounter 15 minutes   Routine   Type Initial   Assessment Calm; Approachable   Intervention Active listening   Outcome Engaged in conversation   Advance Directives (For Healthcare)   Healthcare Directive No, patient does not have an advance directive for healthcare treatment   Advance Directives Documents given  (pt on phone, left AD docs w/guide w/pt per pt request)   Electronically signed by Stacy Fletcher on 4/17/2019 at 2:22 PM

## 2019-04-17 NOTE — ED NOTES
Report called to THAD Delgado, all questions answered. Pain assessment completed as documented. Pt remains alert and oriented x4, with 5L o2/nc.        Zainab Chung RN  04/16/19 [id number]

## 2019-04-17 NOTE — CARE COORDINATION
INITIAL CASE MANAGEMENT ASSESSMENT    Reviewed chart, met with patient to assess possible discharge needs. Explained Case Management role/services. Living Situation: Patient lives at [facility] at [address].    ADLs: Reports being independent but receives assistance from [facility]     DME: Rollator, shower chair, RTS, 4L O2 through [employer]    PT/OT Recs: None ordered. Does not feels she needs them to eval her at this time     Active Services: [facility] [phone]). Attempted to confirm but out of business hours. Will follow up in AM.     Transportation: Does not drive. Reports she uses [address] or [facility]. Will need [employer] at discharge ([phone]). Reports she has portable O2. Medications: No barriers to taking/obtaining medications    PCP: Dr. Emery Trujillo      HD/PD: N/a    PLAN/COMMENTS: No needs identified at this time. SW/CM provided contact information for patient or family to call with any questions. SW/CM will follow and assist as needed. Electronically signed by JOSE LUIS Espana on 4/17/2019 at 4:30 PM      Confirmed patient gets a [facility] 3x/week through [facility] [address] [facility] [address].      Electronically signed by JOSE LUIS Espana on 4/18/2019 at 9:42 AM

## 2019-04-17 NOTE — H&P
Hospital Medicine History & Physical      PCP: Mayelin Julio MD    Date of Admission: 4/16/2019    Date of Service: Pt seen/examined on 4/16/19 and Admitted to Inpatient     Chief Complaint:  Sob     History Of Present Illness: The patient is a 59 y.o. female who presents to [facility] with acute onset and progressive course of sob. Sob started 1 week ago sob worse with exertion no relieving factors sob associated with cough with white sputum no fever chills cp hx of similar episodes in past     Past Medical History:        Diagnosis Date    Acute kidney failure (HCC)     Arthritis     Asthma     CAD (coronary artery disease)     Congestive heart failure (HCC)     [facility]     Dependence on supplemental oxygen     GERD (gastroesophageal reflux disease)     Heart disease     Hypertension     Muscle weakness     Obesity     Schizophrenia ([facility]     Type 2 diabetes mellitus without complication ([facility]        Past Surgical History:        Procedure Laterality Date    ANKLE SURGERY      error       Medications Prior to Admission:    Prior to Admission medications    Medication Sig Start Date End Date Taking?  Authorizing Provider   torsemide (DEMADEX) 20 MG tablet TAKE 1 TABLET BY MOUTH 2 TIMES DAILY *START THIS WHEN FUROSEMIDE PRESCRIPTION IS COMPLETE* 2/19/19   Alyson Rodrigez MD   Umeclidinium Bromide (INCRUSE ELLIPTA) 62.5 MCG/INH AEPB Inhale 1 puff into the lungs daily 1/31/19   Kai Staton DO   Umeclidinium Bromide (INCRUSE ELLIPTA) 62.5 MCG/INH AEPB Inhale 1 puff into the lungs daily 1/31/19   Kai Staton DO   tiotropium [facility] HANDIHALER) 18 MCG inhalation capsule Inhale 1 capsule into the lungs daily 1/21/19   Kai Staton DO   fluticasone-salmeterol (Lexi Dang DISKUS) 250-50 MCG/DOSE AEPB Inhale 1 puff into the lungs every 12 hours 1/21/19   Luane Pali, DO   albuterol sulfate HFA (PROAIR HFA) 108 (90 Base) MCG/ACT inhaler Inhale 2 puffs into the lungs every 4 hours as needed for Wheezing or Shortness of Breath 1/21/19   [facility], DO   albuterol (PROVENTIL) (2.5 MG/3ML) 0.083% nebulizer solution Take 3 mLs by nebulization every 4 hours as needed for Wheezing 1/21/19   [facility], DO   vitamin D (CHOLECALCIFEROL) 1000 UNIT TABS tablet Take 1,000 Units by mouth daily    Historical Provider, MD   clonazePAM (KLONOPIN) 0.5 MG tablet Take 0.5 mg by mouth 2 times daily as needed. Sara January     Historical Provider, MD   albuterol (PROVENTIL) (2.5 MG/3ML) 0.083% nebulizer solution Take 3 mLs by nebulization every 4 hours as needed for Wheezing 9/17/18   Luane Pali, DO   tiotropium (Francesca Mallet) 18 MCG inhalation capsule Inhale 1 capsule into the lungs daily 5/17/18   Luane Pali, DO   fluticasone-salmeterol (ADVAIR DISKUS) 250-50 MCG/DOSE AEPB Inhale 1 puff into the lungs every 12 hours 5/17/18   [facility], DO   albuterol sulfate  (90 Base) MCG/ACT inhaler Inhale 2 puffs into the lungs every 4 hours as needed for Wheezing or Shortness of Breath (or cough) 5/17/18   Luane Pali, DO   acetaminophen (TYLENOL) 500 MG tablet Take 500 mg by mouth every 6 hours as needed for Pain    Historical Provider, MD   ondansetron (ZOFRAN-ODT) 4 MG disintegrating tablet Take 4 mg by mouth every 8 hours as needed for Nausea or Vomiting    Historical Provider, MD   polyethylene glycol (GLYCOLAX) powder Take 17 g by mouth daily    Historical Provider, MD   sodium chloride 1 g tablet Take 1 g by mouth daily    Historical Provider, MD   Ascorbic Acid (VITAMIN C) 500 MG tablet Take 500 mg by mouth 2 times daily    Historical Provider, MD   metoprolol succinate (TOPROL XL) 25 MG extended release tablet Take 0.5 tablets by mouth daily 9/22/17   Silke Olivera, APRN - CNP   aspirin 81 MG tablet Take 81 mg by mouth daily    Historical Provider, MD   divalproex (DEPAKOTE) 500 MG DR tablet Take 500 mg by mouth 1 tab in AM and 2 tabs in PM    Historical Provider, MD   ferrous sulfate 325 (65 Fe) MG tablet Take 325 mg by mouth daily (with breakfast)    Historical Provider, MD   loratadine (CLARITIN) 10 MG tablet Take 10 mg by mouth daily    Historical Provider, MD   LORazepam (ATIVAN) 1 MG tablet Take 1 mg by mouth every 8 hours as needed for Anxiety    Historical Provider, MD   lisinopril (PRINIVIL;ZESTRIL) 10 MG tablet Take 10 mg by mouth daily    Historical Provider, MD   tiotropium (SPIRIVA HANDIHALER) 18 MCG inhalation capsule Inhale 18 mcg into the lungs daily    Historical Provider, MD   escitalopram (LEXAPRO) 10 MG tablet Take 10 mg by mouth daily    Historical Provider, MD   spironolactone (ALDACTONE) 25 MG tablet Take 1 tablet by mouth daily 2/3/17   Alyson Rodrigez MD   QUEtiapine (SEROQUEL) 200 MG tablet Take 200 mg by mouth nightly    Historical Provider, MD   fluticasone (FLONASE) 50 MCG/ACT nasal spray 1 spray by Nasal route daily 10/25/16   Shalom Lopez MD   ARIPiprazole (ABILIFY) 5 MG tablet Take 15 mg by mouth daily     Historical Provider, MD   ranitidine (ZANTAC) 150 MG capsule Take 150 mg by mouth 2 times daily    Historical Provider, MD   albuterol (PROVENTIL HFA;VENTOLIN HFA) 108 (90 BASE) MCG/ACT inhaler Inhale 2 puffs into the lungs every 6 hours as needed for Wheezing. Historical Provider, MD       Allergies:  Pcn [penicillins]    Social History:  The patient currently lives home     TOBACCO:   reports that she has been smoking. She has a 40.00 pack-year smoking history. She has never used smokeless tobacco.  ETOH:   reports that she drinks alcohol. Family History:  Reviewed in detail and negative for DM, Early CAD, Cancer, CVA.  Positive as follows:        Problem Relation Age of Onset    Hypertension Father     Hypertension Brother        REVIEW OF SYSTEMS:   Positive for sob  and as noted in the HPI. All other systems reviewed and negative. PHYSICAL EXAM:    /70   Pulse 101   Temp 98.3 °F (36.8 °C) (Oral)   Resp 23   Ht 5' 9\" (1.753 m)   Wt 293 lb 3.4 oz (133 kg)   SpO2 90%   BMI 43.30 kg/m²     General appearance: No apparent distress appears stated age and cooperative. HEENT Normal cephalic, atraumatic without obvious deformity. Pupils equal, round, and reactive to light. Extra ocular muscles intact. Conjunctivae/corneas clear. Neck: Supple, No jugular venous distention/bruits. Trachea midline without thyromegaly or adenopathy with full range of motion. Lungs: +crackles and rhonchi at bases bialterally   Heart: Regular rate and rhythm with Normal S1/S2 without murmurs, rubs or gallops, point of maximum impulse non-displaced  Abdomen: Soft, non-tender or non-distended without rigidity or guarding and positive bowel sounds all four quadrants. Extremities: No clubbing, cyanosis, or edema bilaterally. Full range of motion without deformity and normal gait intact. Skin: Skin color, texture, turgor normal.  No rashes or lesions. Neurologic: Alert and oriented X 3, neurovascularly intact with sensory/motor intact upper extremities/lower extremities, bilaterally. Cranial nerves: II-XII intact, grossly non-focal.  Mental status: Alert, oriented, thought content appropriate.   Capillary Refill: Acceptable  < 3 seconds  Peripheral Pulses: +3 Easily felt, not easily obliterated with pressure      CXR:  I have reviewed the CXR   EKG:  I have reviewed the EKG    CBC   Recent Labs     04/16/19 1910   WBC 7.6   HGB 12.8   HCT 40.4         RENAL  Recent Labs     04/16/19 1910      K 5.3*   CL 96*   CO2 30   BUN 11   CREATININE 0.5*     LFT'S  Recent Labs     04/16/19 1910   AST 19   ALT 8*   BILITOT 0.3   ALKPHOS 84     COAG  No results for input(s): INR in the last 72 hours. CARDIAC ENZYMES  Recent Labs     04/16/19  1910   TROPONINI <0.01       U/A:    Lab Results   Component Value Date    COLORU YELLOW 01/29/2017    WBCUA 8 01/29/2017    RBCUA 18 01/29/2017    CLARITYU Clear 01/29/2017    SPECGRAV 1.012 01/29/2017    LEUKOCYTESUR TRACE 01/29/2017    BLOODU SMALL 01/29/2017    GLUCOSEU Negative 01/29/2017       ABG    Lab Results   Component Value Date    NTL0AKO 36.6 01/31/2017    BEART 10.6 01/31/2017    M6RYUUYJ 91.2 01/31/2017    PHART 7.459 01/31/2017    YOL8NIB 52.3 01/31/2017    PO2ART 60.1 01/31/2017    LIN7SHH 38.2 01/31/2017           Active Hospital Problems    Diagnosis Date Noted    COPD exacerbation ([facility] [address] [J44.1] 10/23/2016         PHYSICIANS CERTIFICATION:    I certify that Adam Figueroa is expected to be hospitalized for more than 2 midnights based on the following assessment and plan:      ASSESSMENT/PLAN:  Acute on chronic hypoxic resp. failure  htn  chf  hld    Oxygen  Nebs  Steroids  Diuresis  Resume home meds  Hold acei and aldactone in view of hyeprkalemia  Monitor bmp      DVT Prophylaxis: lovenox  Diet: No diet orders on file  Code Status: Prior      Dispo - inpt. Debbi Adan MD    Thank you Mary Pinto MD for the opportunity to be involved in this patient's care. If you have any questions or concerns please feel free to contact me at [phone].

## 2019-04-17 NOTE — PROGRESS NOTES
Pt admitted to [facility]. Pt is A and O x4. Pt is UAL with a walker. Pt is on 02 5lpm NC. Pt has no complaints of pain at the moment. Pt is SOB on exertion.

## 2019-04-18 ENCOUNTER — APPOINTMENT (OUTPATIENT)
Dept: GENERAL RADIOLOGY | Age: 59
DRG: 189 | End: 2019-04-18
Payer: COMMERCIAL

## 2019-04-18 ENCOUNTER — HOSPITAL ENCOUNTER (OUTPATIENT)
Dept: CARDIAC REHAB | Age: 59
Setting detail: THERAPIES SERIES
End: 2019-04-18
Payer: COMMERCIAL

## 2019-04-18 LAB
ALBUMIN SERPL-MCNC: 3.6 G/DL (ref 3.4–5)
ANION GAP SERPL CALCULATED.3IONS-SCNC: 10 MMOL/L (ref 3–16)
BASE EXCESS ARTERIAL: 7.8 MMOL/L (ref -3–3)
BASE EXCESS ARTERIAL: 8.8 MMOL/L (ref -3–3)
BASOPHILS ABSOLUTE: 0 K/UL (ref 0–0.2)
BASOPHILS RELATIVE PERCENT: 0.3 %
BUN BLDV-MCNC: 16 MG/DL (ref 7–20)
CALCIUM SERPL-MCNC: 10.4 MG/DL (ref 8.3–10.6)
CARBOXYHEMOGLOBIN ARTERIAL: 1.7 % (ref 0–1.5)
CARBOXYHEMOGLOBIN ARTERIAL: 1.7 % (ref 0–1.5)
CHLORIDE BLD-SCNC: 93 MMOL/L (ref 99–110)
CO2: 32 MMOL/L (ref 21–32)
CREAT SERPL-MCNC: 0.7 MG/DL (ref 0.6–1.2)
EOSINOPHILS ABSOLUTE: 0 K/UL (ref 0–0.6)
EOSINOPHILS RELATIVE PERCENT: 0.1 %
GFR AFRICAN AMERICAN: >60
GFR NON-AFRICAN AMERICAN: >60
GLUCOSE BLD-MCNC: 121 MG/DL (ref 70–99)
GLUCOSE BLD-MCNC: 128 MG/DL (ref 70–99)
GLUCOSE BLD-MCNC: 95 MG/DL (ref 70–99)
HCO3 ARTERIAL: 37.8 MMOL/L (ref 21–29)
HCO3 ARTERIAL: 37.8 MMOL/L (ref 21–29)
HCT VFR BLD CALC: 45.2 % (ref 36–48)
HEMOGLOBIN, ART, EXTENDED: 13.9 G/DL (ref 12–16)
HEMOGLOBIN, ART, EXTENDED: 14.1 G/DL (ref 12–16)
HEMOGLOBIN: 14.2 G/DL (ref 12–16)
LYMPHOCYTES ABSOLUTE: 0.9 K/UL (ref 1–5.1)
LYMPHOCYTES RELATIVE PERCENT: 9.4 %
MCH RBC QN AUTO: 26.3 PG (ref 26–34)
MCHC RBC AUTO-ENTMCNC: 31.3 G/DL (ref 31–36)
MCV RBC AUTO: 83.9 FL (ref 80–100)
METHEMOGLOBIN ARTERIAL: 0.8 %
METHEMOGLOBIN ARTERIAL: 0.8 %
MONOCYTES ABSOLUTE: 0.4 K/UL (ref 0–1.3)
MONOCYTES RELATIVE PERCENT: 4.6 %
NEUTROPHILS ABSOLUTE: 8.2 K/UL (ref 1.7–7.7)
NEUTROPHILS RELATIVE PERCENT: 85.6 %
O2 CONTENT ARTERIAL: 17 ML/DL
O2 CONTENT ARTERIAL: 18 ML/DL
O2 SAT, ARTERIAL: 87.9 %
O2 SAT, ARTERIAL: 92.8 %
O2 THERAPY: ABNORMAL
O2 THERAPY: ABNORMAL
PCO2 ARTERIAL: 75.4 MMHG (ref 35–45)
PCO2 ARTERIAL: 84.3 MMHG (ref 35–45)
PDW BLD-RTO: 17.6 % (ref 12.4–15.4)
PERFORMED ON: ABNORMAL
PERFORMED ON: NORMAL
PH ARTERIAL: 7.27 (ref 7.35–7.45)
PH ARTERIAL: 7.32 (ref 7.35–7.45)
PHOSPHORUS: 2.9 MG/DL (ref 2.5–4.9)
PLATELET # BLD: 158 K/UL (ref 135–450)
PMV BLD AUTO: 9.5 FL (ref 5–10.5)
PO2 ARTERIAL: 57.9 MMHG (ref 75–108)
PO2 ARTERIAL: 65.3 MMHG (ref 75–108)
POTASSIUM SERPL-SCNC: 4.8 MMOL/L (ref 3.5–5.1)
PROCALCITONIN: 0.06 NG/ML (ref 0–0.15)
RBC # BLD: 5.39 M/UL (ref 4–5.2)
SODIUM BLD-SCNC: 135 MMOL/L (ref 136–145)
TCO2 ARTERIAL: 40.1 MMOL/L
TCO2 ARTERIAL: 40.4 MMOL/L
WBC # BLD: 9.6 K/UL (ref 4–11)

## 2019-04-18 PROCEDURE — 6370000000 HC RX 637 (ALT 250 FOR IP): Performed by: NURSE PRACTITIONER

## 2019-04-18 PROCEDURE — 36415 COLL VENOUS BLD VENIPUNCTURE: CPT

## 2019-04-18 PROCEDURE — 94640 AIRWAY INHALATION TREATMENT: CPT

## 2019-04-18 PROCEDURE — 6370000000 HC RX 637 (ALT 250 FOR IP): Performed by: INTERNAL MEDICINE

## 2019-04-18 PROCEDURE — 94761 N-INVAS EAR/PLS OXIMETRY MLT: CPT

## 2019-04-18 PROCEDURE — 97530 THERAPEUTIC ACTIVITIES: CPT

## 2019-04-18 PROCEDURE — 2580000003 HC RX 258: Performed by: INTERNAL MEDICINE

## 2019-04-18 PROCEDURE — 94762 N-INVAS EAR/PLS OXIMTRY CONT: CPT

## 2019-04-18 PROCEDURE — 2700000000 HC OXYGEN THERAPY PER DAY

## 2019-04-18 PROCEDURE — 80069 RENAL FUNCTION PANEL: CPT

## 2019-04-18 PROCEDURE — 84145 PROCALCITONIN (PCT): CPT

## 2019-04-18 PROCEDURE — 97535 SELF CARE MNGMENT TRAINING: CPT | Performed by: PHYSICIAN ASSISTANT

## 2019-04-18 PROCEDURE — 6360000002 HC RX W HCPCS: Performed by: INTERNAL MEDICINE

## 2019-04-18 PROCEDURE — 2060000000 HC ICU INTERMEDIATE R&B

## 2019-04-18 PROCEDURE — 85025 COMPLETE CBC W/AUTO DIFF WBC: CPT

## 2019-04-18 PROCEDURE — 94660 CPAP INITIATION&MGMT: CPT

## 2019-04-18 PROCEDURE — 82803 BLOOD GASES ANY COMBINATION: CPT

## 2019-04-18 PROCEDURE — 97116 GAIT TRAINING THERAPY: CPT

## 2019-04-18 PROCEDURE — 97162 PT EVAL MOD COMPLEX 30 MIN: CPT

## 2019-04-18 PROCEDURE — 71046 X-RAY EXAM CHEST 2 VIEWS: CPT

## 2019-04-18 PROCEDURE — 36600 WITHDRAWAL OF ARTERIAL BLOOD: CPT

## 2019-04-18 PROCEDURE — 97167 OT EVAL HIGH COMPLEX 60 MIN: CPT | Performed by: PHYSICIAN ASSISTANT

## 2019-04-18 PROCEDURE — 6360000002 HC RX W HCPCS: Performed by: NURSE PRACTITIONER

## 2019-04-18 RX ORDER — BUSPIRONE HYDROCHLORIDE 10 MG/1
10 TABLET ORAL 3 TIMES DAILY
Status: DISCONTINUED | OUTPATIENT
Start: 2019-04-18 | End: 2019-04-22 | Stop reason: HOSPADM

## 2019-04-18 RX ORDER — FUROSEMIDE 10 MG/ML
40 INJECTION INTRAMUSCULAR; INTRAVENOUS ONCE
Status: DISCONTINUED | OUTPATIENT
Start: 2019-04-18 | End: 2019-04-18

## 2019-04-18 RX ORDER — GUAIFENESIN 600 MG/1
600 TABLET, EXTENDED RELEASE ORAL 2 TIMES DAILY
Qty: 14 TABLET | Refills: 0 | Status: SHIPPED | OUTPATIENT
Start: 2019-04-18 | End: 2019-04-25

## 2019-04-18 RX ORDER — METHYLPREDNISOLONE SODIUM SUCCINATE 40 MG/ML
40 INJECTION, POWDER, LYOPHILIZED, FOR SOLUTION INTRAMUSCULAR; INTRAVENOUS ONCE
Status: COMPLETED | OUTPATIENT
Start: 2019-04-18 | End: 2019-04-18

## 2019-04-18 RX ORDER — PREDNISONE 20 MG/1
40 TABLET ORAL DAILY
Qty: 10 TABLET | Refills: 0 | Status: SHIPPED | OUTPATIENT
Start: 2019-04-19 | End: 2019-04-24

## 2019-04-18 RX ORDER — FUROSEMIDE 10 MG/ML
40 INJECTION INTRAMUSCULAR; INTRAVENOUS 2 TIMES DAILY
Status: DISCONTINUED | OUTPATIENT
Start: 2019-04-18 | End: 2019-04-19

## 2019-04-18 RX ADMIN — FERROUS SULFATE TAB EC 324 MG (65 MG FE EQUIVALENT) 324 MG: 324 (65 FE) TABLET DELAYED RESPONSE at 09:28

## 2019-04-18 RX ADMIN — SPIRONOLACTONE 25 MG: 25 TABLET ORAL at 09:28

## 2019-04-18 RX ADMIN — GUAIFENESIN 600 MG: 600 TABLET, EXTENDED RELEASE ORAL at 09:31

## 2019-04-18 RX ADMIN — METHYLPREDNISOLONE SODIUM SUCCINATE 40 MG: 40 INJECTION, POWDER, FOR SOLUTION INTRAMUSCULAR; INTRAVENOUS at 12:00

## 2019-04-18 RX ADMIN — CLONAZEPAM 0.5 MG: 0.5 TABLET ORAL at 21:02

## 2019-04-18 RX ADMIN — FUROSEMIDE 40 MG: 10 INJECTION, SOLUTION INTRAMUSCULAR; INTRAVENOUS at 18:02

## 2019-04-18 RX ADMIN — LISINOPRIL 10 MG: 10 TABLET ORAL at 09:28

## 2019-04-18 RX ADMIN — QUETIAPINE FUMARATE 200 MG: 200 TABLET ORAL at 21:01

## 2019-04-18 RX ADMIN — PREDNISONE 40 MG: 20 TABLET ORAL at 09:28

## 2019-04-18 RX ADMIN — DIVALPROEX SODIUM 500 MG: 500 TABLET, DELAYED RELEASE ORAL at 14:28

## 2019-04-18 RX ADMIN — FAMOTIDINE 20 MG: 20 TABLET ORAL at 09:28

## 2019-04-18 RX ADMIN — IPRATROPIUM BROMIDE AND ALBUTEROL SULFATE 1 AMPULE: .5; 3 SOLUTION RESPIRATORY (INHALATION) at 19:20

## 2019-04-18 RX ADMIN — GUAIFENESIN 600 MG: 600 TABLET, EXTENDED RELEASE ORAL at 21:01

## 2019-04-18 RX ADMIN — FAMOTIDINE 20 MG: 20 TABLET ORAL at 21:01

## 2019-04-18 RX ADMIN — ENOXAPARIN SODIUM 40 MG: 40 INJECTION SUBCUTANEOUS at 09:31

## 2019-04-18 RX ADMIN — OXYCODONE HYDROCHLORIDE AND ACETAMINOPHEN 500 MG: 500 TABLET ORAL at 09:28

## 2019-04-18 RX ADMIN — DIVALPROEX SODIUM 500 MG: 500 TABLET, DELAYED RELEASE ORAL at 21:02

## 2019-04-18 RX ADMIN — ESCITALOPRAM OXALATE 10 MG: 10 TABLET ORAL at 09:30

## 2019-04-18 RX ADMIN — ARIPIPRAZOLE 15 MG: 15 TABLET ORAL at 09:29

## 2019-04-18 RX ADMIN — ASPIRIN 81 MG: 81 TABLET, COATED ORAL at 09:28

## 2019-04-18 RX ADMIN — Medication 10 ML: at 21:04

## 2019-04-18 RX ADMIN — BUSPIRONE HYDROCHLORIDE 10 MG: 10 TABLET ORAL at 21:02

## 2019-04-18 RX ADMIN — CETIRIZINE HYDROCHLORIDE 5 MG: 10 TABLET, FILM COATED ORAL at 09:30

## 2019-04-18 RX ADMIN — IPRATROPIUM BROMIDE AND ALBUTEROL SULFATE 1 AMPULE: .5; 3 SOLUTION RESPIRATORY (INHALATION) at 09:15

## 2019-04-18 RX ADMIN — DIVALPROEX SODIUM 500 MG: 500 TABLET, DELAYED RELEASE ORAL at 05:40

## 2019-04-18 RX ADMIN — METOPROLOL SUCCINATE 12.5 MG: 25 TABLET, EXTENDED RELEASE ORAL at 09:28

## 2019-04-18 RX ADMIN — TORSEMIDE 10 MG: 20 TABLET ORAL at 09:29

## 2019-04-18 RX ADMIN — MOMETASONE FUROATE AND FORMOTEROL FUMARATE DIHYDRATE 2 PUFF: 200; 5 AEROSOL RESPIRATORY (INHALATION) at 09:15

## 2019-04-18 RX ADMIN — Medication 10 ML: at 09:32

## 2019-04-18 RX ADMIN — IPRATROPIUM BROMIDE AND ALBUTEROL SULFATE 1 AMPULE: .5; 3 SOLUTION RESPIRATORY (INHALATION) at 11:48

## 2019-04-18 RX ADMIN — IPRATROPIUM BROMIDE AND ALBUTEROL SULFATE 1 AMPULE: .5; 3 SOLUTION RESPIRATORY (INHALATION) at 16:12

## 2019-04-18 RX ADMIN — TIOTROPIUM BROMIDE 18 MCG: 18 CAPSULE ORAL; RESPIRATORY (INHALATION) at 09:15

## 2019-04-18 RX ADMIN — OXYCODONE HYDROCHLORIDE AND ACETAMINOPHEN 500 MG: 500 TABLET ORAL at 21:01

## 2019-04-18 ASSESSMENT — PAIN SCALES - GENERAL
PAINLEVEL_OUTOF10: 0
PAINLEVEL_OUTOF10: 0

## 2019-04-18 NOTE — PROGRESS NOTES
Occupational Therapy   Occupational Therapy Initial Assessment  Date: 2019   Patient Name: Manuela Melvin  MRN: [id number]     : 1960    Date of Service: 2019    Discharge Recommendations:  Patient would benefit from continued therapy after discharge, 3-5 sessions per week  OT Equipment Recommendations  Other: To be determined    Assessment   Performance deficits / Impairments: Decreased functional mobility ; Decreased cognition;Decreased high-level IADLs;Decreased ADL status; Decreased endurance;Decreased fine motor control;Decreased coordination;Decreased strength;Decreased balance;Decreased safe awareness  Assessment: Pt lives alone in an apartment at the [facility] where she is independent with ADLS and mobility. Treatment Diagnosis: Impaired ADLS  Prognosis: Good  Decision Making: High Complexity  Patient Education: Safety  Barriers to Learning: Impaired cognition  REQUIRES OT FOLLOW UP: Yes  Activity Tolerance  Activity Tolerance: Treatment limited secondary to decreased cognition  Safety Devices  Safety Devices in place: Yes  Type of devices: Bed alarm in place; Left in bed;Nurse notified;Call light within reach;Gait belt;Patient at risk for falls; All fall risk precautions in place  Restraints  Initially in place: No           Patient Diagnosis(es): The primary encounter diagnosis was Hypoxia. Diagnoses of Shortness of breath and COPD exacerbation ([address] were also pertinent to this visit. has a past medical history of Acute kidney failure ([address] Arthritis, Asthma, Congestive heart failure ([address] COPD (chronic obstructive pulmonary disease) ([address] Dependence on supplemental oxygen, GERD (gastroesophageal reflux disease), Heart disease, Hyperlipidemia, Hypertension, Muscle weakness, Obesity, Schizophrenia ([address] and Type 2 diabetes mellitus without complication ([address] has a past surgical history that includes Ankle surgery; hernia repair; and Hysterectomy.     Treatment Diagnosis: Impaired ADLS      Restrictions  Restrictions/Precautions  Restrictions/Precautions: Contact Precautions  Position Activity Restriction  Other position/activity restrictions: Monitor O2 sats, Contact and droplet precautions. Subjective   General  Chart Reviewed: Yes  Patient assessed for rehabilitation services?: Yes  Additional Pertinent Hx: Pt  has a past medical history of Acute kidney failure ([facility] [address] Arthritis, Asthma, Congestive heart failure ([facility] [address] COPD (chronic obstructive pulmonary disease) ([facility] [address] Dependence on supplemental oxygen, GERD (gastroesophageal reflux disease), Heart disease, Hyperlipidemia, Hypertension, Muscle weakness, Obesity, Schizophrenia ([facility] [address] and Type 2 diabetes mellitus without complication ([facility] [address] Pt  has a past surgical history that includes Ankle surgery; hernia repair; and Hysterectomy. Family / Caregiver Present: No  Referring Practitioner: Jose G  Diagnosis: COPD exacerbation. Pt admitted on 4-16 via ED with SOB and nasal congestion  General Comment  Comments: Pt with recent increase in O2 requirement, and abn ABGs.   Oxygen Therapy  SpO2: 93 %  O2 Device: High flow nasal cannula  O2 Flow Rate (L/min): 8 L/min  Blood Gas  Performed?: Yes  Jose's Test #1: Collateral flow confirmed  Site #1: Left Radial  Site Prepped #1: Yes  Number of Attempts #1: 1  Pressure Held #1: Yes  Complications #1: None  Post-procedure #1: Standard  Specimen Status #1: To lab  How Tolerated?: Tolerated well  Social/Functional History  Social/Functional History  Lives With: Alone  Type of Home: Apartment  Home Layout: One level  Home Access: Level entry  Bathroom Shower/Tub: Walk-in shower  Bathroom Toilet: Handicap height  Bathroom Equipment: Shower chair, Grab bars in shower, Grab bars around toilet, Toilet raiser  Bathroom Accessibility: Emely Villaseñor accessible  Home Equipment: 4 wheeled walker, Oxygen  Receives Help From: Home health  ADL Assistance: Independent  Homemaking Assistance: Independent  Homemaking redirect  Memory: Decreased recall of recent events  Safety Judgement: Decreased awareness of need for assistance  Problem Solving: Decreased awareness of errors  Insights: Decreased awareness of deficits  Initiation: Requires cues for all  Sequencing: Requires cues for all                 LUE AROM (degrees)  LUE AROM : WFL  RUE AROM (degrees)  RUE AROM : WFL  LUE Strength  Gross LUE Strength: Exceptions to [facility]  LUE Strength Comment: Pt not following test directions well. RUE Strength  Gross RUE Strength: Exceptions to [facility]  RUE Strength Comment: Pt not following test directions well. Plan   Plan  Times per week: 3-5 xs  Times per day: Daily  Plan weeks: 1-2 weeks  Current Treatment Recommendations: Strengthening, Safety Education & Training, Balance Training, Patient/Caregiver Education & Training, Self-Care / ADL, Cognitive/Perceptual Training, Functional Mobility Training, Neuromuscular Re-education, Equipment Evaluation, Education, & procurement, Home Management Training, Endurance Training    G-Code     OutComes Score       Samantha Cons Samantha Cons scored a 13/24 on the [facility] ADL Inpatient form. Current research shows that an -PAC score of 17 or less is typically not associated with a discharge to the patient's home setting. Based on the patients AM-PAC score and their current ADL deficits, it is recommended that the patient have 3-5 sessions per week of Occupational Therapy at d/c to increase the patients independence.                                               AM-PAC Score        AM-PAC Inpatient Daily Activity Raw Score: 13  AM-PAC Inpatient ADL T-Scale Score : 32.03  ADL Inpatient CMS 0-100% Score: 63.03  ADL Inpatient CMS G-Code Modifier : CL    Goals  Short term goals  Time Frame for Short term goals: 1-2 weeks  Short term goal 1: Mod I bathing  Short term goal 2: Mod I dressing   Short term goal 3: Mod I toileting  Short term goal 4: I grooming at sink  Short term goal 5: Mod I

## 2019-04-18 NOTE — PROGRESS NOTES
Patient transferred to room 5114. Patient is alert and oriented x4. Patient has been moved to this unit for continuous BiPAP. Patient originally very against the idea of the mask however after some time patient agreed to wear it and is resting comfortably in the bed. VSS. Skin intact. Will continue to monitor.

## 2019-04-18 NOTE — PROGRESS NOTES
Pt AAO on assessment, VSS. Sats >90% on home dependenet 4L O2. Wheezing and rhonchi auscultated, encouraged IS and cough and deep breathing. Cough is congested, thick cloudy phlegm per pt. Nightly medications given. Denies pain or any other discomforts. Up independently to commode. No further needs at this time.

## 2019-04-18 NOTE — PROGRESS NOTES
Np called and notified of ABG result. Patient to be transferred to PCU and started on BIPAP, repeat abg in 30 min after bipap on please. Going for cxr now. ANNE MARIE Hendricks CNP 4/18/2019 2:42 PM     Consulted Pulmonary. Added IV lasix, ECHO ordered    Acute respiratory failure with hypercapnia and hypoxia, likely related to Acute diastolic heart failure   ANNE MARIE Hendricks CNP 4/18/2019 4:04 PM     Patient evaluated. Awake, on bipap, not tolerating well, added buspar for anxiety. Awaiting abg results   ANNE MARIE Hendricks CNP 4/18/2019 5:31 PM     Follow up on ABG noted, continue bipap for now. Hopefully she can be a little more compliant tonight while sleeping.     ANNE MARIE Hendricks CNP 4/18/2019 7:29 PM

## 2019-04-18 NOTE — PLAN OF CARE
Problem: Pain:  Goal: Pain level will decrease  Description  Pain level will decrease  4/17/2019 2217 by Camila Ortiz RN  Outcome: Ongoing     Problem: Falls - Risk of:  Goal: Will remain free from falls  Description  Will remain free from falls  4/17/2019 2217 by Camila Ortiz RN  Outcome: Ongoing

## 2019-04-18 NOTE — PROGRESS NOTES
Patient has orders to be transferred to PCU, room 5114. for Elevated PCO2 84.3. Patient rtemaina alert andf oriented x4,able to verbalize needs. COntinous O2 @ 8L/min hi rose via nasal cannula. Pt denies having SOB, HOB elevated. Report called to [facility].

## 2019-04-18 NOTE — PROGRESS NOTES
Physical Therapy   Co-Eval with OT  Initial Assessment / Treatment Note    Room Number: R6U-8635/6196-32  NAME: Josue LEON: Medical Record Number: [id number]  MRN: [id number]    ASSESSMENT: Martha Sneed is a 59 y.o. F admit 19 with resp failure -- found to have metapneumovirus. Today she was retaining CO2 by lab value and hypoxic by SpO2 -- 84-86%; she required Min Assist for transfers and tolerated only a couple shuffle steps bed<>commode. She is functioning poorly today but primarily due to her respiratory status. If she continues to function poorly at discharge then recommend 3-5x/wk skilled PT at discharge; otherwise return to prior living environment at [facility]. Will follow. Discharge Recommendations: Continue to assess pending progress  Equipment Needs: Equipment Needed: No    Date of Service: 19       Patient Diagnosis(es): The primary encounter diagnosis was Hypoxia. Diagnoses of Shortness of breath and COPD exacerbation ([address] were also pertinent to this visit. Past Medical History:  has a past medical history of Acute kidney failure ([address] Arthritis, Asthma, Congestive heart failure ([address] COPD (chronic obstructive pulmonary disease) ([address] Dependence on supplemental oxygen, GERD (gastroesophageal reflux disease), Heart disease, Hyperlipidemia, Hypertension, Muscle weakness, Obesity, Schizophrenia ([address] and Type 2 diabetes mellitus without complication ([address] Past Surgical History:  has a past surgical history that includes Ankle surgery; hernia repair; and Hysterectomy. Restrictions  Restrictions/Precautions: Contact Precautions        Other position/activity restrictions: Monitor O2 sats, Contact and droplet precautions.     Vision/Hearing  Vision: Within Functional Limits  Hearing: Within functional limits    SUBJECTIVE:  Chart Reviewed: Yes  Patient assessed for rehabilitation services?: Yes  Additional Pertinent Hx: Martha Sneed is a 59 y.o. F admit 19 with 1 examined but pt appears to have some hip/knee extension weakness in transfers    Bed mobility  Supine to Sit: Contact guard assistance, Minimal assistance  Sit to Supine: Contact guard assistance    Transfers  Sit to Stand: Contact guard assistance, Minimal Assistance  Stand to sit: Contact guard assistance, Minimal Assistance    Ambulation  Ambulation  Ambulation?: Yes  Ambulation 1  Device: No Device  Assistance: Contact guard assistance, Minimal assistance(couple steps bed to [facility] and back to bed)  Quality of Gait: shuffling steps, marginal foot clearance, slightly unsteady  Distance: couple steps  Comments: SpO2 84%-86% throughout session on 8L/min via high flow NC    Stairs/Curb  Stairs/Curb  Stairs?: No     Balance  Balance  Sitting - Static: Good  Standing - Static: Fair, +(CGA with no UE support)        Treatment included transfer training, gait training, and patient education. This note also serves as a D/C Summary in the event that this patient is discharged prior to the next therapy session. Please refer to last PT note for goal status, discharge recommendations and functional status. ASSESSMENT:   Assessment: Candi Alvarez is a 59 y.o. F admit 4/16/19 with resp failure -- found to have metapneumovirus. Today she was retaining CO2 by lab value and hypoxic by SpO2 -- 84-86%; she required Min Assist for transfers and tolerated only a couple shuffle steps bed<>commode. She is functioning poorly today but primarily due to her respiratory status. If she continues to function poorly at discharge then recommend 3-5x/wk skilled PT at discharge; otherwise return to prior living environment at [facility]. Will follow.   Treatment Diagnosis: Decreased activity tolerance  Prognosis: Good  Decision Making: Medium Complexity  History: Acute kidney failure (HCC), Arthritis, Asthma, Congestive heart failure ([address] COPD (chronic obstructive pulmonary disease) [address] Dependence on supplemental oxygen, GERD (gastroesophageal reflux disease), Heart disease, Hyperlipidemia, Hypertension, Muscle weakness, Obesity, Schizophrenia ([address] and Type 2 diabetes mellitus without complication ([address] Exam: Decreased activity tolerance  Clinical Presentation: Evolving activity diandra  Patient Education: Role of PT. She verb limited understanding. Barriers to Learning: Cog  REQUIRES PT FOLLOW UP: Yes  Discharge Recommendations: Continue to assess pending progress    Adam Figueroa scored a 18/24 on the AM-PAC short mobility form. Initial research suggests that an AM-PAC score of 18 or greater may be associated with a discharge to patient's home setting. However in this initial research, cut off scores do not perfectly predict discharge location: 25% of patients with a score of 18 or greater actually went to a rehab facility and 20% of people with a score less than 18 actually went home. Based on my clinical judgement I recommend that the patient have 3-5 sessions per week of Physical Therapy at d/c to increase the patients independence. Safety devices:   Type of devices:  All fall risk precautions in place, Call light within reach, Patient at risk for falls, Nurse notified, Left in bed, Bed alarm in place        PLAN:  Times per week: 3-5x/wk while in the hospital;    Current Treatment Recommendations: Functional Mobility Training     OutComes Score  How much difficulty turning over in bed?: None  How much difficulty sitting down on / standing up from a chair with arms?: A Little  How much difficulty moving from lying on back to sitting on side of bed?: A Little  How much help from another person moving to and from a bed to a chair?: A Little  How much help from another person needed to walk in hospital room?: A Little  How much help from another person for climbing 3-5 steps with a railing?: A Lot  AM-PAC Inpatient Mobility Raw Score : 18  AM-PAC Inpatient T-Scale Score : 43.63  Mobility Inpatient CMS 0-100% Score: 46.58  Mobility Inpatient CMS G-Code Modifier : CK       Goals  Patient Goals   Patient goals : to get back home  Time Frame for Short term goals: upon d/c  Short term goal 1: sup<>sit Ind   Short term goal 2: sit<>stand SBA   Short term goal 3: amb 80' with walker and SBA              Therapy Time    Individual Concurrent Group Co-treatment   Time In        1333   Time Out      1420   Minutes        47    Timed Code Treatment Minutes: 30 Minutes      Ernesto Tate, PT

## 2019-04-18 NOTE — PROGRESS NOTES
Lab notified nurse of PCO2 84.3 . Patient remains alert and oriented. Oxygen 8L/high rose nasal cannula. Continuous SPO2 94%. Page out to MASTER Morgan CNP awaiting new orders.

## 2019-04-18 NOTE — PROGRESS NOTES
Hospital Medicine Progress Note      Admit Date: 4/16/2019         Overnight Events: none    CC: F/U for SOB    HPI:   This is a 55-year-old female with a history of respiratory failure on oxygen 3-4 L at home, COPD, GERD, hyperlipidemia, hypertension, diabetes and schizophrenia who presented to the ED with complaints of increasing shortness of breath. She states that her symptoms are present for approximately 3 weeks. She was found to have Metapneumoviris. Interval History/Subjective:   States that she feels better, now has productive cough, denies fevers, chills, cp, sob at rest. No nausea or vomiting. Review of Systems:     Comprehensive ROS negative except as mentioned above. Past Medical History:        Diagnosis Date    Acute kidney failure (HCC)     Arthritis     Asthma     Congestive heart failure (HCC)     COPD (chronic obstructive pulmonary disease) (HCC)     Dependence on supplemental oxygen     GERD (gastroesophageal reflux disease)     Heart disease     Hyperlipidemia     Hypertension     Muscle weakness     Obesity     Sch[address]ia [address] [facility]     Type 2 diabetes mellitus without complication ([facility] [address]        Past Surgical History:        Procedure Laterality Date    ANKLE SURGERY      error    HERNIA REPAIR      HYSTERECTOMY         Allergies:  Pcn [penicillins]    Past medical and surgical history reviewed. Any changes have been noted.      Scheduled and prn Medications:    Scheduled Meds:   guaiFENesin  600 mg Oral BID    mometasone-formoterol  2 puff Inhalation BID    cetirizine  5 mg Oral Daily    lisinopril  10 mg Oral Daily    spironolactone  25 mg Oral Daily    tiotropium  18 mcg Inhalation Daily    ARIPiprazole  15 mg Oral Daily    vitamin C  500 mg Oral BID    aspirin  81 mg Oral Daily    divalproex  500 mg Oral 3 times per day    escitalopram  10 mg Oral Daily    ferrous sulfate  324 mg Oral Daily with breakfast    metoprolol succinate  12.5 mg Oral 02/03/2017       Lab Results   Component Value Date    ALT 8 (L) 04/16/2019    AST 19 04/16/2019    ALKPHOS 84 04/16/2019    BILITOT 0.3 04/16/2019        No flowsheet data found. Imaging:  XR CHEST STANDARD (2 VW)   Final Result   1. Cardiomegaly and pulmonary edema with small left pleural effusion   suggesting CHF. 2. Patchy bibasilar opacities may be atelectasis or infiltrates. Assessment & Plan:      COPD exacerbation  - likely secondary to Sam[place]  - continue prednisone  - Continue scheduled DuoNeb's  - Added Mucinex  - Flutter and incentive spirometer- encouraged, did not see flutter at bedside and this was discussed with nursing staff, Filiberto Gibson  - Will need to be referred to the wellness Center at discharge- orders placed  - Continue home meds  - still with lots of Rhonchi and wheezing, will hold dc until tomorrow and give extra doses of steroids today. Add pulmicort    Acute on Chronic hypoxic respiratory failure  - Continue oxygen  - Wean To home dose of 3-4 L  - o2 level now 88, requiring 8L of oxygen, stat abg, cxr now    Essential hypertension  - Lisinopril    Hyperlipidemia, unspecified    Patient does not have CHF    Schizophrenia  - on seroquel, depakote, abilitfy  - flat affect    Obese  - BMI 43    Body mass index is 43.46 kg/m². The patient and / or the family were informed of the results of any tests, a time was given to answer questions, a plan was proposed and they agreed with plan.     DVT prophylaxis: [x] Lovenox  [] SQ Heparin  [] SCDs because of  [] warfarin/oral direct thrombin inhibitor [] Encourage ambulation    GI prophylaxis: [] PPI/[id number]  [x] not indicated    Probiotic if on abx: [] Yes [] No [x] Not Indicated    Diet: DIET CARDIAC;    Consults:  IP CONSULT TO SPIRITUAL SERVICES  IP CONSULT TO SOCIAL WORK    Disposition:  [] Home  [] Home with home health [] Rehab [] Psych [] SNF  [] LTAC  [] Long term nursing home or group home [] Transfer to ICU  [] Transfer to U [] Other:    Code Status: Full Code    ELOS: tbd      ANNE MARIE Bryant CNP  04/18/19

## 2019-04-18 NOTE — PROGRESS NOTES
4 Eyes Skin Assessment     The patient is being assess for  Transfer to New Unit    I agree that 2 RN's have performed a thorough Head to Toe Skin Assessment on the patient. ALL assessment sites listed below have been assessed. Areas assessed by both nurses:   [x]   Head, Face, and Ears   [x]   Shoulders, Back, and Chest  [x]   Arms, Elbows, and Hands   [x]   Coccyx, Sacrum, and IschIum  [x]   Legs, Feet, and Heels        Does the Patient have Skin Breakdown?   No         Darren Prevention initiated:  No   Wound Care Orders initiated:  No      [facility] nurse consulted for Pressure Injury (Stage 3,4, Unstageable, DTI, NWPT, and Complex wounds), New and Established Ostomies:  No      Nurse 1 eSignature: Electronically signed by Cely Brandon RN on 4/18/19 at 4:24 PM    **SHARE this note so that the co-signing nurse is able to place an eSignature**    Nurse 2 eSignature: Electronically signed by Miguelito eKrns RN on 4/18/19 at 6:17 PM

## 2019-04-19 LAB
ALBUMIN SERPL-MCNC: 3.3 G/DL (ref 3.4–5)
ANION GAP SERPL CALCULATED.3IONS-SCNC: 7 MMOL/L (ref 3–16)
BASE EXCESS ARTERIAL: 10.4 MMOL/L (ref -3–3)
BASOPHILS ABSOLUTE: 0 K/UL (ref 0–0.2)
BASOPHILS RELATIVE PERCENT: 0.5 %
BUN BLDV-MCNC: 20 MG/DL (ref 7–20)
CALCIUM SERPL-MCNC: 10.4 MG/DL (ref 8.3–10.6)
CARBOXYHEMOGLOBIN ARTERIAL: 1.3 % (ref 0–1.5)
CHLORIDE BLD-SCNC: 95 MMOL/L (ref 99–110)
CO2: 36 MMOL/L (ref 21–32)
CREAT SERPL-MCNC: 0.7 MG/DL (ref 0.6–1.2)
EOSINOPHILS ABSOLUTE: 0 K/UL (ref 0–0.6)
EOSINOPHILS RELATIVE PERCENT: 0.1 %
GFR AFRICAN AMERICAN: >60
GFR NON-AFRICAN AMERICAN: >60
GLUCOSE BLD-MCNC: 111 MG/DL (ref 70–99)
HCO3 ARTERIAL: 39.9 MMOL/L (ref 21–29)
HCT VFR BLD CALC: 41.4 % (ref 36–48)
HEMOGLOBIN, ART, EXTENDED: 13.7 G/DL (ref 12–16)
HEMOGLOBIN: 13 G/DL (ref 12–16)
LYMPHOCYTES ABSOLUTE: 1.2 K/UL (ref 1–5.1)
LYMPHOCYTES RELATIVE PERCENT: 17 %
MCH RBC QN AUTO: 25.8 PG (ref 26–34)
MCHC RBC AUTO-ENTMCNC: 31.4 G/DL (ref 31–36)
MCV RBC AUTO: 82.2 FL (ref 80–100)
METHEMOGLOBIN ARTERIAL: 0.9 %
MONOCYTES ABSOLUTE: 0.4 K/UL (ref 0–1.3)
MONOCYTES RELATIVE PERCENT: 5.7 %
NEUTROPHILS ABSOLUTE: 5.5 K/UL (ref 1.7–7.7)
NEUTROPHILS RELATIVE PERCENT: 76.7 %
O2 CONTENT ARTERIAL: 18 ML/DL
O2 SAT, ARTERIAL: 93.4 %
O2 THERAPY: ABNORMAL
PCO2 ARTERIAL: 81.6 MMHG (ref 35–45)
PDW BLD-RTO: 17.3 % (ref 12.4–15.4)
PH ARTERIAL: 7.31 (ref 7.35–7.45)
PHOSPHORUS: 3 MG/DL (ref 2.5–4.9)
PLATELET # BLD: 165 K/UL (ref 135–450)
PMV BLD AUTO: 9 FL (ref 5–10.5)
PO2 ARTERIAL: 70.1 MMHG (ref 75–108)
POTASSIUM SERPL-SCNC: 4.7 MMOL/L (ref 3.5–5.1)
PRO-BNP: 90 PG/ML (ref 0–124)
RBC # BLD: 5.04 M/UL (ref 4–5.2)
SODIUM BLD-SCNC: 138 MMOL/L (ref 136–145)
TCO2 ARTERIAL: 42.4 MMOL/L
WBC # BLD: 7.1 K/UL (ref 4–11)

## 2019-04-19 PROCEDURE — 2060000000 HC ICU INTERMEDIATE R&B

## 2019-04-19 PROCEDURE — 6370000000 HC RX 637 (ALT 250 FOR IP): Performed by: NURSE PRACTITIONER

## 2019-04-19 PROCEDURE — 2700000000 HC OXYGEN THERAPY PER DAY

## 2019-04-19 PROCEDURE — 6360000002 HC RX W HCPCS: Performed by: INTERNAL MEDICINE

## 2019-04-19 PROCEDURE — 83880 ASSAY OF NATRIURETIC PEPTIDE: CPT

## 2019-04-19 PROCEDURE — 85025 COMPLETE CBC W/AUTO DIFF WBC: CPT

## 2019-04-19 PROCEDURE — 94660 CPAP INITIATION&MGMT: CPT

## 2019-04-19 PROCEDURE — 6370000000 HC RX 637 (ALT 250 FOR IP): Performed by: INTERNAL MEDICINE

## 2019-04-19 PROCEDURE — 82803 BLOOD GASES ANY COMBINATION: CPT

## 2019-04-19 PROCEDURE — 2580000003 HC RX 258: Performed by: INTERNAL MEDICINE

## 2019-04-19 PROCEDURE — 94640 AIRWAY INHALATION TREATMENT: CPT

## 2019-04-19 PROCEDURE — 36600 WITHDRAWAL OF ARTERIAL BLOOD: CPT

## 2019-04-19 PROCEDURE — 99223 1ST HOSP IP/OBS HIGH 75: CPT | Performed by: INTERNAL MEDICINE

## 2019-04-19 PROCEDURE — 80069 RENAL FUNCTION PANEL: CPT

## 2019-04-19 PROCEDURE — 6360000002 HC RX W HCPCS: Performed by: NURSE PRACTITIONER

## 2019-04-19 PROCEDURE — 36415 COLL VENOUS BLD VENIPUNCTURE: CPT

## 2019-04-19 PROCEDURE — 94762 N-INVAS EAR/PLS OXIMTRY CONT: CPT

## 2019-04-19 RX ORDER — AZITHROMYCIN 250 MG/1
250 TABLET, FILM COATED ORAL DAILY
Status: DISCONTINUED | OUTPATIENT
Start: 2019-04-20 | End: 2019-04-20

## 2019-04-19 RX ORDER — AZITHROMYCIN 500 MG/1
500 TABLET, FILM COATED ORAL DAILY
Status: COMPLETED | OUTPATIENT
Start: 2019-04-19 | End: 2019-04-19

## 2019-04-19 RX ADMIN — FAMOTIDINE 20 MG: 20 TABLET ORAL at 09:18

## 2019-04-19 RX ADMIN — ESCITALOPRAM OXALATE 10 MG: 10 TABLET ORAL at 09:19

## 2019-04-19 RX ADMIN — LISINOPRIL 10 MG: 10 TABLET ORAL at 09:19

## 2019-04-19 RX ADMIN — IPRATROPIUM BROMIDE AND ALBUTEROL SULFATE 1 AMPULE: .5; 3 SOLUTION RESPIRATORY (INHALATION) at 12:31

## 2019-04-19 RX ADMIN — GUAIFENESIN 600 MG: 600 TABLET, EXTENDED RELEASE ORAL at 20:48

## 2019-04-19 RX ADMIN — MOMETASONE FUROATE AND FORMOTEROL FUMARATE DIHYDRATE 2 PUFF: 200; 5 AEROSOL RESPIRATORY (INHALATION) at 12:31

## 2019-04-19 RX ADMIN — BUSPIRONE HYDROCHLORIDE 10 MG: 10 TABLET ORAL at 20:48

## 2019-04-19 RX ADMIN — IPRATROPIUM BROMIDE AND ALBUTEROL SULFATE 1 AMPULE: .5; 3 SOLUTION RESPIRATORY (INHALATION) at 20:47

## 2019-04-19 RX ADMIN — IPRATROPIUM BROMIDE AND ALBUTEROL SULFATE 1 AMPULE: .5; 3 SOLUTION RESPIRATORY (INHALATION) at 07:37

## 2019-04-19 RX ADMIN — ARIPIPRAZOLE 15 MG: 15 TABLET ORAL at 09:18

## 2019-04-19 RX ADMIN — SPIRONOLACTONE 25 MG: 25 TABLET ORAL at 09:18

## 2019-04-19 RX ADMIN — Medication 10 ML: at 20:49

## 2019-04-19 RX ADMIN — TORSEMIDE 10 MG: 20 TABLET ORAL at 09:19

## 2019-04-19 RX ADMIN — DIVALPROEX SODIUM 500 MG: 500 TABLET, DELAYED RELEASE ORAL at 06:05

## 2019-04-19 RX ADMIN — QUETIAPINE FUMARATE 200 MG: 200 TABLET ORAL at 20:51

## 2019-04-19 RX ADMIN — FAMOTIDINE 20 MG: 20 TABLET ORAL at 20:48

## 2019-04-19 RX ADMIN — MOMETASONE FUROATE AND FORMOTEROL FUMARATE DIHYDRATE 2 PUFF: 200; 5 AEROSOL RESPIRATORY (INHALATION) at 20:47

## 2019-04-19 RX ADMIN — OXYCODONE HYDROCHLORIDE AND ACETAMINOPHEN 500 MG: 500 TABLET ORAL at 20:48

## 2019-04-19 RX ADMIN — Medication 10 ML: at 09:28

## 2019-04-19 RX ADMIN — DIVALPROEX SODIUM 500 MG: 500 TABLET, DELAYED RELEASE ORAL at 20:49

## 2019-04-19 RX ADMIN — BUSPIRONE HYDROCHLORIDE 10 MG: 10 TABLET ORAL at 09:19

## 2019-04-19 RX ADMIN — ASPIRIN 81 MG: 81 TABLET, COATED ORAL at 09:18

## 2019-04-19 RX ADMIN — DIVALPROEX SODIUM 500 MG: 500 TABLET, DELAYED RELEASE ORAL at 14:16

## 2019-04-19 RX ADMIN — CETIRIZINE HYDROCHLORIDE 5 MG: 10 TABLET, FILM COATED ORAL at 09:19

## 2019-04-19 RX ADMIN — IPRATROPIUM BROMIDE AND ALBUTEROL SULFATE 1 AMPULE: .5; 3 SOLUTION RESPIRATORY (INHALATION) at 16:10

## 2019-04-19 RX ADMIN — ENOXAPARIN SODIUM 40 MG: 40 INJECTION SUBCUTANEOUS at 09:19

## 2019-04-19 RX ADMIN — OXYCODONE HYDROCHLORIDE AND ACETAMINOPHEN 500 MG: 500 TABLET ORAL at 09:19

## 2019-04-19 RX ADMIN — BUSPIRONE HYDROCHLORIDE 10 MG: 10 TABLET ORAL at 14:16

## 2019-04-19 RX ADMIN — PREDNISONE 40 MG: 20 TABLET ORAL at 09:18

## 2019-04-19 RX ADMIN — AZITHROMYCIN 500 MG: 500 TABLET, FILM COATED ORAL at 09:19

## 2019-04-19 RX ADMIN — METOPROLOL SUCCINATE 12.5 MG: 25 TABLET, EXTENDED RELEASE ORAL at 09:19

## 2019-04-19 RX ADMIN — GUAIFENESIN 600 MG: 600 TABLET, EXTENDED RELEASE ORAL at 09:18

## 2019-04-19 RX ADMIN — FERROUS SULFATE TAB EC 324 MG (65 MG FE EQUIVALENT) 324 MG: 324 (65 FE) TABLET DELAYED RESPONSE at 09:58

## 2019-04-19 RX ADMIN — FUROSEMIDE 40 MG: 10 INJECTION, SOLUTION INTRAMUSCULAR; INTRAVENOUS at 09:19

## 2019-04-19 ASSESSMENT — PAIN DESCRIPTION - PAIN TYPE: TYPE: ACUTE PAIN

## 2019-04-19 ASSESSMENT — PAIN SCALES - GENERAL
PAINLEVEL_OUTOF10: 0

## 2019-04-19 ASSESSMENT — PAIN DESCRIPTION - DESCRIPTORS: DESCRIPTORS: ACHING

## 2019-04-19 ASSESSMENT — PAIN - FUNCTIONAL ASSESSMENT: PAIN_FUNCTIONAL_ASSESSMENT: ACTIVITIES ARE NOT PREVENTED

## 2019-04-19 ASSESSMENT — PAIN DESCRIPTION - LOCATION: LOCATION: BACK;CHEST

## 2019-04-19 ASSESSMENT — PAIN DESCRIPTION - PROGRESSION: CLINICAL_PROGRESSION: NOT CHANGED

## 2019-04-19 ASSESSMENT — PAIN DESCRIPTION - FREQUENCY: FREQUENCY: INTERMITTENT

## 2019-04-19 ASSESSMENT — PAIN DESCRIPTION - ORIENTATION: ORIENTATION: INNER

## 2019-04-19 ASSESSMENT — PAIN DESCRIPTION - ONSET: ONSET: ON-GOING

## 2019-04-19 NOTE — PROGRESS NOTES
escitalopram  10 mg Oral Daily    ferrous sulfate  324 mg Oral Daily with breakfast    metoprolol succinate  12.5 mg Oral Daily    QUEtiapine  200 mg Oral Nightly    torsemide  10 mg Oral Daily    sodium chloride flush  10 mL Intravenous 2 times per day    enoxaparin  40 mg Subcutaneous Daily    famotidine  20 mg Oral BID    nicotine  1 patch Transdermal Daily    ipratropium-albuterol  1 ampule Inhalation Q4H WA    predniSONE  40 mg Oral Daily     Continuous Infusions:  PRN Meds:.perflutren lipid microspheres, acetaminophen, albuterol sulfate HFA, clonazePAM, sodium chloride flush, magnesium hydroxide, ondansetron    PHYSICAL EXAM:  /76   Pulse 105   Temp 97.8 °F (36.6 °C) (Oral)   Resp 18   Ht 5' 9\" (1.753 m)   Wt 288 lb 2.3 oz (130.7 kg)   SpO2 96%   BMI 42.55 kg/m²       Intake/Output Summary (Last 24 hours) at 4/19/2019 1723  Last data filed at 4/19/2019 1421  Gross per 24 hour   Intake 480 ml   Output 680 ml   Net -200 ml       General: Alert and oriented. Resting in bed in no apparent distress. Obese, flat affect today  HEENT: Normocephalic. Atraumatic. Pupils equal and reactive. EOM intact. Oral mucosa pink/moist/intact. Neck: Supple. Symmetrical. Trachea midline. Lungs: Clear to auscultation all fields Respirations even and unlabored. Chest: Exam unremarkable. Cardiac: S1/S2 noted. Regular Rhythm and rate. Abdomen/GI: Soft. Non-tender. Non-distended. BS+. Extremities: PP+. Atraumatic. No redness/cyanosis/edema noted. Brisk cap refill. Skin: Dry and intact. No lesions noted. Neuro: Grossly intact. No focal deficits noted.      LABS:    Lab Results   Component Value Date    WBC 7.1 04/19/2019    HGB 13.0 04/19/2019    HCT 41.4 04/19/2019    MCV 82.2 04/19/2019     04/19/2019    LYMPHOPCT 17.0 04/19/2019    RBC 5.04 04/19/2019    MCH 25.8 (L) 04/19/2019    MCHC 31.4 04/19/2019    RDW 17.3 (H) 04/19/2019       Lab Results   Component Value Date    CREATININE 0.7 04/19/2019 BUN 20 04/19/2019     04/19/2019    K 4.7 04/19/2019    CL 95 (L) 04/19/2019    CO2 36 (H) 04/19/2019       Lab Results   Component Value Date    MG 2.00 02/03/2017       Lab Results   Component Value Date    ALT 8 (L) 04/16/2019    AST 19 04/16/2019    ALKPHOS 84 04/16/2019    BILITOT 0.3 04/16/2019        No flowsheet data found. Imaging:  XR CHEST STANDARD (2 VW)   Final Result   Moderate CHF         XR CHEST STANDARD (2 VW)   Final Result   1. Cardiomegaly and pulmonary edema with small left pleural effusion   suggesting CHF. 2. Patchy bibasilar opacities may be atelectasis or infiltrates. Assessment & Plan:      COPD exacerbation  - likely secondary to [place]  - continue prednisone  - Continue scheduled DuoNeb's  - Added Mucinex  - Flutter and incentive spirometer- encouraged, did not see flutter at bedside and this was discussed with nursing staff, Rex Foster  - follows with Dr. Wu Sanchez and he has been consulted  - did well on Bipap    Acute on Chronic hypoxic and hypercapnia respiratory failure  - Continue oxygen  - Wean To home dose of 3-4 L  - better, now off Bipap, on 8LNC    Acute on chronic diastolic heart failure  - cont torsemide, home dose  - diuresed with lasix well  - daily weights, I/o's (not accurate) (weight down 6lbs)  - monitor on tele    Essential hypertension  - Lisinopril    Hyperlipidemia, unspecified    Patient does not have CHF    Schizophrenia  - on seroquel, depakote, abilitfy  - flat affect    Obese  - BMI 43    Body mass index is 42.55 kg/m². The patient and / or the family were informed of the results of any tests, a time was given to answer questions, a plan was proposed and they agreed with plan.     DVT prophylaxis: [x] Lovenox  [] SQ Heparin  [] SCDs because of  [] warfarin/oral direct thrombin inhibitor [] Encourage ambulation    GI prophylaxis: [] PPI/[id number]  [x] not indicated    Probiotic if on abx: [] Yes [] No [x] Not Indicated    Diet: DIET CARDIAC; Low Sodium (2 GM);  Daily Fluid Restriction: 1500 ml    Consults:  IP CONSULT TO SPIRITUAL SERVICES  IP CONSULT TO SOCIAL WORK  IP CONSULT TO PULMONOLOGY    Disposition:  [] Home  [] Home with home health [] Rehab [] Psych [] SNF  [] LTAC  [] Long term nursing home or group home [] Transfer to ICU  [] Transfer to PCU [] Other:    Code Status: Full Code    ELOS: tbd      ANNE MARIE Whiting - RICHARD  04/19/19

## 2019-04-19 NOTE — CARE COORDINATION
Patient transferred from  to U. New PT/OT orders needed. Therapy is continuing to assess pending progress. Yesterday, PT stated that if she continues to function poorly, will need SNF. OT recommending SNF. PT AMPAC score 18 and OT AMPAC score is 13. Patient min assist with transfers, feed/grooming, and mobility and moderate assistance with bathing/dressing. Sw notified Jose Miguel Shea NP that new PT/OT orders will be needed when appropriate. Hilario met with patient to discuss discharge plans. She would like to return home, but did acknowledge that she is more weak than normal. She only has a HHA 3x a week and may need more care than she can obtain at home. Discussed back up options in case she is not able to return home. She would be agreeable to SNF for short stay. List provided. She chose [facility] as she has been there in the past.     Referral initiated with Tina Gonzales [phone] at [facility], faxed referral to [phone]. Await return call. If needing SNF, will need Kalpana Heck pre-cert.      Faby Pitts, [address] [facility]  [phone]  4/19/19 at 11:39 AM

## 2019-04-19 NOTE — CONSULTS
cyanosis. No joint deformity. Neuro: Awake. Alert. Moves all four extremities. EXT:   trace edema, no clubbing    LABS:  CBC:   Recent Labs     19  1910 19  1331 19  0425   WBC 7.6 9.6 7.1   HGB 12.8 14.2 13.0   HCT 40.4 45.2 41.4   MCV 82.4 83.9 82.2    158 165     BMP:   Recent Labs     19  0850 19  1331 19  0425    135* 138   K 5.0 4.8 4.7   CL 97* 93* 95*   CO2 32 32 36*   PHOS  --  2.9 3.0   BUN 12 16 20   CREATININE 0.6 0.7 0.7     LIVER PROFILE:   Recent Labs     19   AST 19   ALT 8*   BILITOT 0.3   ALKPHOS 84     PT/INR: No results for input(s): PROTIME, INR in the last 72 hours. APTT: No results for input(s): APTT in the last 72 hours. UA:No results for input(s): NITRITE, COLORU, PHUR, LABCAST, WBCUA, RBCUA, MUCUS, TRICHOMONAS, YEAST, BACTERIA, CLARITYU, SPECGRAV, LEUKOCYTESUR, UROBILINOGEN, BILIRUBINUR, BLOODU, GLUCOSEU, AMORPHOUS in the last 72 hours. Invalid input(s): Emma Brown  Recent Labs     19  1728 19  0552   PHART 7.321* 7.310*   HMG6KQU 75.4* 81.6*   PO2ART 65.3* 70.1*       Cultures:  Film array:  metapneumovirus    PFTs:  Severe obstruction       ECHO:  Pending     AB.31/82/70  7.32/75/65  7.27/84/58    Chest X-ray:  Chest imaging was reviewed by me and showed interstitial edema with right hilar fullness    Chest CT: 10/2018  Chest imaging was reviewed by me and showed emphysema, atelectasis    I reviewed all the above labs and studies pertaining to this visit. ASSESSMENT:  · Acute on Chronic Hypoxic/Hypercapnic Respiratory Failure  · COPD exacebration  · Viral bronchitis/Pneumonia  · Tobacco Abuse    PLAN:  · Ok to come off BIPAP today once breakfast arrives.   Her pH is near her baseline so her CO2 level is moot  Titrate oxygen for saturations greater than or equal to 90%  · Prednisone 40 mg for 5 days  · Duonebs q 4 hours  · DC Spiriva while on duonebs  · Dulera q 12 hours  · Add zpak  · DVT prophylaxis  · PT/OT    She is asking to go home because she left her apartment door open and no one is around to secure her belongings. I told her she cannot go home yet since she has not been out of bed since being admitted. Will consult PT/OT today.   I understand her plight and feel bad but she would be risk for readmission if she left today    I will arrange follow up with me      Angelito Cummings,   [facility] Pulmonary

## 2019-04-19 NOTE — PROGRESS NOTES
Occupational Therapy  Patient transferred up to PCU from 4N d/t ABG result and now on Bipap therefore will need new therapy orders when medically ready to resume therapy.      Thank you,     Danis Tabor OTR/L #[id number]

## 2019-04-19 NOTE — CARE COORDINATION
Per Ilda Santacruz at [facility], they are still reviewing and will get back with SW on Monday. She reported that they may not have any female beds available. Hilario notified Jad Jones NP that new PT/OT orders are still needed. Sw spoke to patient, she agreed to review SNF list for back up options. She has general SNF list as [employer] [facility] [employer] web site not functioning today. She reported that she feels that she will be able to return home if she continues to improve. She is hoping to avoid SNF. She is not sure if she is agreeable to [facility] if she is able to return home or not. Patient stated that she has portable o2 tank with her, will need [employer] transportation([phone]) if she returns home. She is active with Rolanda Sinha [facility] for A([phone]). IF SNF is needed, will need [employer] pre-cert once placement confirmed. IF patient is able to return home, will need to f/u to confirm whether she is agreeable to [facility] or not. Will need transportation home. Hilario will continue to follow.      Boni Matthews, [address] [facility]  [phone]  4/19/19 at 4:13 PM

## 2019-04-19 NOTE — PROGRESS NOTES
Alerted bedside RN, Christi, pt had dual diuretic orders active. Both Lasix IV and Demadex PO were administered this AM.  K Eddie NP ordered IV dose and [facility] verified order in pharmacy. This is only duplicate that has been administered. Christi RN to [employer] and make aware / clarify order.

## 2019-04-19 NOTE — PLAN OF CARE
Problem: Pain:  Goal: Pain level will decrease  Description  Pain level will decrease  Outcome: Ongoing   Pain/discomfort being managed with PRN analgesics per MD orders. Pt able to express presence and absence of pain and rate pain appropriately using numerical scale. Problem: Falls - Risk of:  Goal: Will remain free from falls  Description  Will remain free from falls  Outcome: Ongoing   Fall risk assessment completed per unit protocol. Patient's bed is in the lowest position, call light is within reach and the patient's room is free of clutter. The patient has been instructed to call for assistance before getting out of bed or the chair. Problem: Breathing Pattern - Ineffective:  Goal: Ability to achieve and maintain a regular respiratory rate will improve  Description  Ability to achieve and maintain a regular respiratory rate will improve  Outcome: Ongoing   Patient was on BiPAP continuously overnight and was removed this AM per pulmonology and placed on 9L of oxygen per high flow nasal cannula.

## 2019-04-20 ENCOUNTER — APPOINTMENT (OUTPATIENT)
Dept: GENERAL RADIOLOGY | Age: 59
DRG: 189 | End: 2019-04-20
Payer: COMMERCIAL

## 2019-04-20 LAB
ALBUMIN SERPL-MCNC: 3.4 G/DL (ref 3.4–5)
ANION GAP SERPL CALCULATED.3IONS-SCNC: 9 MMOL/L (ref 3–16)
BASE EXCESS VENOUS: 11 MMOL/L
BUN BLDV-MCNC: 17 MG/DL (ref 7–20)
CALCIUM SERPL-MCNC: 10.5 MG/DL (ref 8.3–10.6)
CARBOXYHEMOGLOBIN: 2.2 %
CHLORIDE BLD-SCNC: 95 MMOL/L (ref 99–110)
CO2: 36 MMOL/L (ref 21–32)
CREAT SERPL-MCNC: 0.5 MG/DL (ref 0.6–1.2)
GFR AFRICAN AMERICAN: >60
GFR NON-AFRICAN AMERICAN: >60
GLUCOSE BLD-MCNC: 122 MG/DL (ref 70–99)
HCO3 VENOUS: 38 MMOL/L (ref 23–29)
LV EF: 55 %
LVEF MODALITY: NORMAL
METHEMOGLOBIN VENOUS: 0.8 %
O2 CONTENT, VEN: 19 ML/DL
O2 SAT, VEN: 99 %
O2 THERAPY: ABNORMAL
PCO2, VEN: 61.5 MMHG (ref 40–50)
PH VENOUS: 7.41 (ref 7.35–7.45)
PHOSPHORUS: 2.3 MG/DL (ref 2.5–4.9)
PO2, VEN: 143 MMHG
POTASSIUM SERPL-SCNC: 3.7 MMOL/L (ref 3.5–5.1)
SODIUM BLD-SCNC: 140 MMOL/L (ref 136–145)
TCO2 CALC VENOUS: 40 MMOL/L

## 2019-04-20 PROCEDURE — 97530 THERAPEUTIC ACTIVITIES: CPT | Performed by: PHYSICAL THERAPIST

## 2019-04-20 PROCEDURE — 6370000000 HC RX 637 (ALT 250 FOR IP): Performed by: INTERNAL MEDICINE

## 2019-04-20 PROCEDURE — 97530 THERAPEUTIC ACTIVITIES: CPT

## 2019-04-20 PROCEDURE — 94668 MNPJ CHEST WALL SBSQ: CPT

## 2019-04-20 PROCEDURE — 99232 SBSQ HOSP IP/OBS MODERATE 35: CPT | Performed by: NURSE PRACTITIONER

## 2019-04-20 PROCEDURE — 97535 SELF CARE MNGMENT TRAINING: CPT

## 2019-04-20 PROCEDURE — 6360000002 HC RX W HCPCS: Performed by: INTERNAL MEDICINE

## 2019-04-20 PROCEDURE — 2580000003 HC RX 258: Performed by: NURSE PRACTITIONER

## 2019-04-20 PROCEDURE — 93306 TTE W/DOPPLER COMPLETE: CPT

## 2019-04-20 PROCEDURE — 71046 X-RAY EXAM CHEST 2 VIEWS: CPT

## 2019-04-20 PROCEDURE — 80069 RENAL FUNCTION PANEL: CPT

## 2019-04-20 PROCEDURE — 94667 MNPJ CHEST WALL 1ST: CPT

## 2019-04-20 PROCEDURE — 36415 COLL VENOUS BLD VENIPUNCTURE: CPT

## 2019-04-20 PROCEDURE — 82803 BLOOD GASES ANY COMBINATION: CPT

## 2019-04-20 PROCEDURE — 97116 GAIT TRAINING THERAPY: CPT | Performed by: PHYSICAL THERAPIST

## 2019-04-20 PROCEDURE — 2580000003 HC RX 258: Performed by: INTERNAL MEDICINE

## 2019-04-20 PROCEDURE — 2700000000 HC OXYGEN THERAPY PER DAY

## 2019-04-20 PROCEDURE — 94640 AIRWAY INHALATION TREATMENT: CPT

## 2019-04-20 PROCEDURE — 97168 OT RE-EVAL EST PLAN CARE: CPT

## 2019-04-20 PROCEDURE — 2060000000 HC ICU INTERMEDIATE R&B

## 2019-04-20 PROCEDURE — 6360000002 HC RX W HCPCS: Performed by: NURSE PRACTITIONER

## 2019-04-20 PROCEDURE — 6370000000 HC RX 637 (ALT 250 FOR IP): Performed by: NURSE PRACTITIONER

## 2019-04-20 PROCEDURE — 2500000003 HC RX 250 WO HCPCS: Performed by: NURSE PRACTITIONER

## 2019-04-20 RX ORDER — BUDESONIDE 0.5 MG/2ML
0.5 INHALANT ORAL 2 TIMES DAILY
Status: DISCONTINUED | OUTPATIENT
Start: 2019-04-20 | End: 2019-04-22 | Stop reason: HOSPADM

## 2019-04-20 RX ORDER — SODIUM CHLORIDE FOR INHALATION 3 %
15 VIAL, NEBULIZER (ML) INHALATION 2 TIMES DAILY
Status: DISCONTINUED | OUTPATIENT
Start: 2019-04-20 | End: 2019-04-22 | Stop reason: HOSPADM

## 2019-04-20 RX ADMIN — DOXYCYCLINE 100 MG: 100 INJECTION, POWDER, LYOPHILIZED, FOR SOLUTION INTRAVENOUS at 18:10

## 2019-04-20 RX ADMIN — METOPROLOL SUCCINATE 12.5 MG: 25 TABLET, EXTENDED RELEASE ORAL at 08:35

## 2019-04-20 RX ADMIN — SODIUM CHLORIDE SOLN NEBU 3% 15 ML: 3 NEBU SOLN at 12:06

## 2019-04-20 RX ADMIN — ASPIRIN 81 MG: 81 TABLET, COATED ORAL at 08:35

## 2019-04-20 RX ADMIN — BUSPIRONE HYDROCHLORIDE 10 MG: 10 TABLET ORAL at 14:09

## 2019-04-20 RX ADMIN — BUDESONIDE 500 MCG: 0.5 SUSPENSION RESPIRATORY (INHALATION) at 12:07

## 2019-04-20 RX ADMIN — CLONAZEPAM 0.5 MG: 0.5 TABLET ORAL at 21:31

## 2019-04-20 RX ADMIN — ENOXAPARIN SODIUM 40 MG: 40 INJECTION SUBCUTANEOUS at 08:38

## 2019-04-20 RX ADMIN — ESCITALOPRAM OXALATE 10 MG: 10 TABLET ORAL at 08:35

## 2019-04-20 RX ADMIN — Medication 10 ML: at 21:32

## 2019-04-20 RX ADMIN — LISINOPRIL 10 MG: 10 TABLET ORAL at 08:36

## 2019-04-20 RX ADMIN — OXYCODONE HYDROCHLORIDE AND ACETAMINOPHEN 500 MG: 500 TABLET ORAL at 21:32

## 2019-04-20 RX ADMIN — QUETIAPINE FUMARATE 200 MG: 200 TABLET ORAL at 21:31

## 2019-04-20 RX ADMIN — IPRATROPIUM BROMIDE AND ALBUTEROL SULFATE 1 AMPULE: .5; 3 SOLUTION RESPIRATORY (INHALATION) at 11:55

## 2019-04-20 RX ADMIN — FAMOTIDINE 20 MG: 20 TABLET ORAL at 08:36

## 2019-04-20 RX ADMIN — IPRATROPIUM BROMIDE AND ALBUTEROL SULFATE 1 AMPULE: .5; 3 SOLUTION RESPIRATORY (INHALATION) at 19:55

## 2019-04-20 RX ADMIN — SODIUM CHLORIDE SOLN NEBU 3% 4 ML: 3 NEBU SOLN at 19:55

## 2019-04-20 RX ADMIN — CETIRIZINE HYDROCHLORIDE 5 MG: 10 TABLET, FILM COATED ORAL at 08:35

## 2019-04-20 RX ADMIN — ARIPIPRAZOLE 15 MG: 15 TABLET ORAL at 08:37

## 2019-04-20 RX ADMIN — SPIRONOLACTONE 25 MG: 25 TABLET ORAL at 08:35

## 2019-04-20 RX ADMIN — BUDESONIDE 500 MCG: 0.5 SUSPENSION RESPIRATORY (INHALATION) at 19:56

## 2019-04-20 RX ADMIN — DIVALPROEX SODIUM 500 MG: 500 TABLET, DELAYED RELEASE ORAL at 06:16

## 2019-04-20 RX ADMIN — Medication 10 ML: at 08:38

## 2019-04-20 RX ADMIN — BUSPIRONE HYDROCHLORIDE 10 MG: 10 TABLET ORAL at 08:35

## 2019-04-20 RX ADMIN — MOMETASONE FUROATE AND FORMOTEROL FUMARATE DIHYDRATE 2 PUFF: 200; 5 AEROSOL RESPIRATORY (INHALATION) at 08:48

## 2019-04-20 RX ADMIN — BUSPIRONE HYDROCHLORIDE 10 MG: 10 TABLET ORAL at 21:31

## 2019-04-20 RX ADMIN — IPRATROPIUM BROMIDE AND ALBUTEROL SULFATE 1 AMPULE: .5; 3 SOLUTION RESPIRATORY (INHALATION) at 08:45

## 2019-04-20 RX ADMIN — GUAIFENESIN 600 MG: 600 TABLET, EXTENDED RELEASE ORAL at 21:32

## 2019-04-20 RX ADMIN — TORSEMIDE 10 MG: 20 TABLET ORAL at 08:36

## 2019-04-20 RX ADMIN — AZITHROMYCIN 250 MG: 250 TABLET, FILM COATED ORAL at 08:36

## 2019-04-20 RX ADMIN — PREDNISONE 40 MG: 20 TABLET ORAL at 08:36

## 2019-04-20 RX ADMIN — DIVALPROEX SODIUM 500 MG: 500 TABLET, DELAYED RELEASE ORAL at 14:09

## 2019-04-20 RX ADMIN — FAMOTIDINE 20 MG: 20 TABLET ORAL at 21:32

## 2019-04-20 RX ADMIN — OXYCODONE HYDROCHLORIDE AND ACETAMINOPHEN 500 MG: 500 TABLET ORAL at 08:34

## 2019-04-20 RX ADMIN — FERROUS SULFATE TAB EC 324 MG (65 MG FE EQUIVALENT) 324 MG: 324 (65 FE) TABLET DELAYED RESPONSE at 08:36

## 2019-04-20 RX ADMIN — IPRATROPIUM BROMIDE AND ALBUTEROL SULFATE 1 AMPULE: .5; 3 SOLUTION RESPIRATORY (INHALATION) at 17:38

## 2019-04-20 RX ADMIN — DIVALPROEX SODIUM 500 MG: 500 TABLET, DELAYED RELEASE ORAL at 21:32

## 2019-04-20 RX ADMIN — GUAIFENESIN 600 MG: 600 TABLET, EXTENDED RELEASE ORAL at 08:35

## 2019-04-20 ASSESSMENT — PAIN SCALES - GENERAL
PAINLEVEL_OUTOF10: 0

## 2019-04-20 ASSESSMENT — PAIN DESCRIPTION - PROGRESSION
CLINICAL_PROGRESSION: NOT CHANGED
CLINICAL_PROGRESSION: NOT CHANGED

## 2019-04-20 ASSESSMENT — PULMONARY FUNCTION TESTS
PEFR_L/MIN: 20
PEFR_L/MIN: 20

## 2019-04-20 NOTE — PROGRESS NOTES
Occupational Therapy   Occupational Therapy Initial Assessment  Date: 2019   Patient Name: Margaret Mills  MRN: [id number]     : 1960    Date of Service: 2019  Margaret Mills scored a 15/24 on the AM-PAC ADL Inpatient form. Current research shows that an AM-PAC score of 17 or less is typically not associated with a discharge to the patient's home setting. Based on the patients AM-PAC score and their current ADL deficits, it is recommended that the patient have 3-5 sessions per week of Occupational Therapy at d/c to increase the patients independence. Discharge Recommendations:  Patient would benefit from continued therapy after discharge, 3-5 sessions per week  OT Equipment Recommendations  Other: To be determined    Assessment   Performance deficits / Impairments: Decreased functional mobility ; Decreased cognition;Decreased high-level IADLs;Decreased ADL status; Decreased endurance;Decreased fine motor control;Decreased coordination;Decreased strength;Decreased balance;Decreased safe awareness  Assessment: is 60 yo female with COPD and h/o schophrenia,. Pt admitted w/ resp issues and confusion. Pt lves at [facility] with support of [facility] 3 x week . Currently pt is on 8 L o2 and has no insight re: her need for hospitalization. She is perseverating on her fears her apt will be inspected , rent raised etc. Pt unable to process rationallly. Currentluy pt needing OT on acute care being below her baseline and may need ongoing therapies prior to return home depending on resolution of her resp status. Pt's cog status at this time may be her biggest limitation.    Treatment Diagnosis: Impaired ADLS, impaired cog processing, pych issues   Prognosis: Good  Decision Making: High Complexity  Patient Education: Safety, comforting pt about her concerns about her apt, need for ongoing hospitalization d/t * L high flow -  great difficulty w/ rational processing at this time  Barriers to Learning: great difficulty w/ rational processing at this time  REQUIRES OT FOLLOW UP: Yes  Safety Devices  Type of devices: Left in chair;Nurse notified; Chair alarm in place;Call light within reach(allneeds in place ed on use of fltter valve and inspirometer)           Patient Diagnosis(es): The primary encounter diagnosis was Hypoxia. Diagnoses of Shortness of breath and COPD exacerbation ([address] were also pertinent to this visit. has a past medical history of Acute kidney failure ([address] Arthritis, Asthma, Congestive heart failure ([address] COPD (chronic obstructive pulmonary disease) ([address] Dependence on supplemental oxygen, GERD (gastroesophageal reflux disease), Heart disease, Hyperlipidemia, Hypertension, Muscle weakness, Obesity, Schizophrenia ([address] and Type 2 diabetes mellitus without complication ([address] has a past surgical history that includes Ankle surgery; hernia repair; and Hysterectomy. Treatment Diagnosis: Impaired ADLS, impaired cog processing, pych issues       Restrictions  Restrictions/Precautions  Restrictions/Precautions: Contact Precautions, Isolation  Position Activity Restriction  Other position/activity restrictions: Monitor O2 sats, Contact and droplet precautions. Subjective   General  Chart Reviewed: Yes  Patient assessed for rehabilitation services?: Yes  Additional Pertinent Hx: Pt  has a past medical history of Acute kidney failure ([address] Arthritis, Asthma, Congestive heart failure ([address] COPD (chronic obstructive pulmonary disease) ([address] Dependence on supplemental oxygen, GERD (gastroesophageal reflux disease), Heart disease, Hyperlipidemia, Hypertension, Muscle weakness, Obesity, Schizophrenia ([address] and Type 2 diabetes mellitus without complication ([address] Pt  has a past surgical history that includes Ankle surgery; hernia repair; and Hysterectomy. Family / Caregiver Present: No  Referring Practitioner: Jsoe G  Diagnosis: COPD exacerbation. Pt admitted on 4-16 via ED with SOB and nasal congestion. assistance  LE Bathing: Moderate assistance  UE Dressing: Moderate assistance  LE Dressing: Moderate assistance  Toileting: Moderate assistance(using BSC- declined need to use toielt)  Additional Comments: Pt not a confused however persevertating on concerns about her apt , no grasp on need to remain in hospital being on high rose 8 L O2. pt not rational re her health needs at this time  Tone RUE  RUE Tone: Normotonic  Tone LUE  LUE Tone: Normotonic  Coordination  Movements Are Fluid And Coordinated: No  Coordination and Movement description: Decreased speed;Decreased accuracy; Right UE;Left UE        Transfers  Stand Step Transfers: Minimal assistance  Sit to stand: Minimal assistance;Contact guard assistance  Stand to sit: Minimal assistance;Contact guard assistance     Cognition  Overall Cognitive Status: Exceptions  Arousal/Alertness: Delayed responses to stimuli  Following Commands: Follows one step commands with increased time; Follows one step commands with repetition  Attention Span: Attends with cues to redirect(distracted perseverating on an apartment inspection and fear she will loose her apt . no rational reasoning impacts her thinking)  Memory: Decreased recall of recent events(perseverates on concerns)  Safety Judgement: Decreased awareness of need for assistance  Problem Solving: Decreased awareness of errors  Insights: Decreased awareness of deficits(pt has no concept as to why she is not being D/C to \"handle her personal business\")  Initiation: Requires cues for all  Sequencing: Requires cues for all  Cognition Comment: Cooperative then became more upset worrying about her apartment and , canceling her  [employer] service and not having a phone  etc...         Sensation  Overall Sensation Status: WFL        LUE AROM (degrees)  LUE AROM : WFL  RUE AROM (degrees)  RUE AROM : WFL  LUE Strength  Gross LUE Strength: WFL  LUE Strength Comment: per observation  RUE Strength  Gross RUE Strength: WFL  RUE Strength Comment: per observation                   Plan   Plan  Times per week: 3-5 xs  Times per day: Daily  Plan weeks: 1-2 weeks  Current Treatment Recommendations: Strengthening, Safety Education & Training, Balance Training, Patient/Caregiver Education & Training, Self-Care / ADL, Cognitive/Perceptual Training, Functional Mobility Training, Neuromuscular Re-education, Equipment Evaluation, Education, & procurement, Home Management Training, Endurance Training    G-Code     OutComes Score  How much help for putting on and taking off regular lower body clothing?: A Lot  How much help for Bathing?: A Lot  How much help for Toileting?: A Lot  How much help for putting on and taking off regular upper body clothing?: A Lot  How much help for taking care of personal grooming?: A Little  How much help for eating meals?: None  AM-PAC Inpatient Daily Activity Raw Score: 15  AM-PAC Inpatient ADL T-Scale Score : 34.69  ADL Inpatient CMS 0-100% Score: 56.46  ADL Inpatient CMS G-Code Modifier : CK    AM-PAC Score     AM-PAC Inpatient Daily Activity Raw Score: 15  AM-PAC Inpatient ADL T-Scale Score : 34.69  ADL Inpatient CMS 0-100% Score: 56.46  ADL Inpatient CMS G-Code Modifier : CK    Goals  Short term goals  Time Frame for Short term goals: 1-2 weeks pt reassessed 4/20 after transfer to PCU- goals remain appropriate but  she mayneed ongoing therapies prior to return home depending on resolution of resp issues  Short term goal 1: Mod I bathing  Short term goal 2: Mod I dressing   Short term goal 3: Mod I toileting  Short term goal 4: I grooming at sink  Short term goal 5: Mod I functional transfers  Short term goal 6: Mod I light home tasks. Patient Goals   Patient goals : Pt wants to be independent again.        Therapy Time   Individual Concurrent Group Co-treatment   Time In 6202         Time Out 1410         Minutes 55               If patient is discharged prior to next OT visit , refer to last OT progress note for Discharge Status.     Lazaro Chambers, GIOVANNI  LIC # [id number]

## 2019-04-20 NOTE — PROGRESS NOTES
Pulmonary/Critical Care Progress Note    CC:  Follow-up:  Acute on chronic hypoxic/hypercapnic respiratory failure  COPD exacerbation  Viral pneumonia  Tobacco abuse    Subjective: On 8L HF, weaned to 6L while in room  Patient seems more confused today, taking off cardiac leads  Coughing mucus up  SOB improved    ROS  No fever  + productive cough  SOB improved    Intake/Output Summary (Last 24 hours) at 4/20/2019 0856  Last data filed at 4/20/2019 0834  Gross per 24 hour   Intake 840 ml   Output 680 ml   Net 160 ml         PHYSICAL EXAM:  Blood pressure (!) 103/56, pulse 92, temperature 97.7 °F (36.5 °C), temperature source Oral, resp. rate (P) 20, height 5' 9\" (1.753 m), weight 285 lb 0.9 oz (129.3 kg), SpO2 (P) 96 %, not currently breastfeeding.'  Gen: No distress. Eyes: PERRL. No sclera icterus. No conjunctival injection. ENT: No discharge. Pharynx clear. External appearance of ears and nose normal.  Neck: Trachea midline. No obvious mass. Resp: No crackles. No wheezes. No rhonchi. Diminished  CV: Regular rate. Regular rhythm. No murmur or rub. No edema  GI: Non-tender. Non-distended. No hernia. Skin: Warm, dry, normal texture and turgor. No abnormalities on exposed extremities. M/S: No cyanosis. No clubbing. No joint deformity. Neuro: Moves all four extremities.    Psych:  Normal mood and affect; exhibits normal insight and judgement     Medications:    Scheduled Meds:   azithromycin  250 mg Oral Daily    busPIRone  10 mg Oral TID    guaiFENesin  600 mg Oral BID    mometasone-formoterol  2 puff Inhalation BID    cetirizine  5 mg Oral Daily    lisinopril  10 mg Oral Daily    spironolactone  25 mg Oral Daily    ARIPiprazole  15 mg Oral Daily    vitamin C  500 mg Oral BID    aspirin  81 mg Oral Daily    divalproex  500 mg Oral 3 times per day    escitalopram  10 mg Oral Daily    ferrous sulfate  324 mg Oral Daily with breakfast    metoprolol succinate  12.5 mg Oral Daily    QUEtiapine 200 mg Oral Nightly    torsemide  10 mg Oral Daily    sodium chloride flush  10 mL Intravenous 2 times per day    enoxaparin  40 mg Subcutaneous Daily    famotidine  20 mg Oral BID    nicotine  1 patch Transdermal Daily    ipratropium-albuterol  1 ampule Inhalation Q4H WA    predniSONE  40 mg Oral Daily       Continuous Infusions:      PRN Meds:  perflutren lipid microspheres, acetaminophen, albuterol sulfate HFA, clonazePAM, sodium chloride flush, magnesium hydroxide, ondansetron    Labs:  CBC:   Recent Labs     19  1331 19  0425   WBC 9.6 7.1   HGB 14.2 13.0   HCT 45.2 41.4   MCV 83.9 82.2    165     BMP:   Recent Labs     19  1331 19  0425   * 138   K 4.8 4.7   CL 93* 95*   CO2 32 36*   PHOS 2.9 3.0   BUN 16 20   CREATININE 0.7 0.7     LIVER PROFILE: No results for input(s): AST, ALT, LIPASE, BILIDIR, BILITOT, ALKPHOS in the last 72 hours. Invalid input(s): AMYLASE,  ALB  PT/INR: No results for input(s): PROTIME, INR in the last 72 hours. APTT: No results for input(s): APTT in the last 72 hours. UA:No results for input(s): NITRITE, COLORU, PHUR, LABCAST, WBCUA, RBCUA, MUCUS, TRICHOMONAS, YEAST, BACTERIA, CLARITYU, SPECGRAV, LEUKOCYTESUR, UROBILINOGEN, BILIRUBINUR, BLOODU, GLUCOSEU, AMORPHOUS in the last 72 hours. Invalid input(s): Kyree Imus  No results for input(s): PH, PCO2, PO2 in the last 72 hours. Films:  Chest imaging and associated reports were personally reviewed and showed CXR : moderate pulmonary edema    AB.31/82/70 93%    Cultures:  Blood: no growth  Influenza: negative  Respiratory Viral Panel: human metapneumovirus    ECHO  17   Summary   This is a limited exam due to patient limitations;  [facility] with normal systolic function. EF    60%.  The right ventricle is mildly enlarged.     Assessment:   Acute on chronic hypoxic/hypercapnic respiratory failure  COPD exacerbation  Viral pneumonia  Tobacco abuse    Plan:  · Maintain oxygen saturations >90% with supplemental oxygen and wean as tolerated  · Check VBG, may need short time on bipap  · Prednisone for 5 days  · Duonebs q4h  · Dulera  · Azithromycin  · PTOT  · Educated patient on the importance of tobacco cessation. The risks related to smoking were reviewed with the patient. Recommend maintaining a smoke-free lifestyle and encouraged healthy habits. Products available for smoking cessation were discussed. Patient verbalized understanding. · NRT with patch  · DVT prophylaxis with lovenox    Follows with Dr. Ulises Arnold    Thank you for allowing me to participate in the care of this patient.     Heike Coleman, AN [facility]

## 2019-04-20 NOTE — PROGRESS NOTES
per day    escitalopram  10 mg Oral Daily    ferrous sulfate  324 mg Oral Daily with breakfast    metoprolol succinate  12.5 mg Oral Daily    QUEtiapine  200 mg Oral Nightly    torsemide  10 mg Oral Daily    sodium chloride flush  10 mL Intravenous 2 times per day    enoxaparin  40 mg Subcutaneous Daily    famotidine  20 mg Oral BID    nicotine  1 patch Transdermal Daily    ipratropium-albuterol  1 ampule Inhalation Q4H WA    predniSONE  40 mg Oral Daily     Continuous Infusions:  PRN Meds:.perflutren lipid microspheres, acetaminophen, albuterol sulfate HFA, clonazePAM, sodium chloride flush, magnesium hydroxide, ondansetron    PHYSICAL EXAM:  BP (!) 103/56   Pulse 92   Temp 97.7 °F (36.5 °C) (Oral)   Resp 20   Ht 5' 9\" (1.753 m)   Wt 285 lb 0.9 oz (129.3 kg)   SpO2 92%   BMI 42.10 kg/m²       Intake/Output Summary (Last 24 hours) at 4/20/2019 1146  Last data filed at 4/20/2019 0834  Gross per 24 hour   Intake 840 ml   Output --   Net 840 ml       General: Alert and oriented. Resting in bed in no apparent distress. Obese, flat affect today, upset she can't be discharged  HEENT: Normocephalic. Atraumatic. Pupils equal and reactive. EOM intact. Oral mucosa pink/moist/intact. Neck: Supple. Symmetrical. Trachea midline. Lungs: Clear to auscultation all fields Respirations even and unlabored. (had rhonchi this am per RT, now has cleared)  Chest: Exam unremarkable. Cardiac: S1/S2 noted. Regular Rhythm and rate. Abdomen/GI: Soft. Non-tender. Non-distended. BS+. Extremities: PP+. Atraumatic. No redness/cyanosis/edema noted. Brisk cap refill. Skin: Dry and intact. No lesions noted. Neuro: Grossly intact. No focal deficits noted.      LABS:    Lab Results   Component Value Date    WBC 7.1 04/19/2019    HGB 13.0 04/19/2019    HCT 41.4 04/19/2019    MCV 82.2 04/19/2019     04/19/2019    LYMPHOPCT 17.0 04/19/2019    RBC 5.04 04/19/2019    MCH 25.8 (L) 04/19/2019    MCHC 31.4 04/19/2019    RDW 17.3 (H) 04/19/2019       Lab Results   Component Value Date    CREATININE 0.5 (L) 04/20/2019    BUN 17 04/20/2019     04/20/2019    K 3.7 04/20/2019    CL 95 (L) 04/20/2019    CO2 36 (H) 04/20/2019       Lab Results   Component Value Date    MG 2.00 02/03/2017       Lab Results   Component Value Date    ALT 8 (L) 04/16/2019    AST 19 04/16/2019    ALKPHOS 84 04/16/2019    BILITOT 0.3 04/16/2019        No flowsheet data found. Imaging:  XR CHEST STANDARD (2 VW)   Final Result   Moderate CHF         XR CHEST STANDARD (2 VW)   Final Result   1. Cardiomegaly and pulmonary edema with small left pleural effusion   suggesting CHF. 2. Patchy bibasilar opacities may be atelectasis or infiltrates. Assessment & Plan:      COPD exacerbation  - likely secondary to [place]  - continue prednisone  - Continue scheduled DuoNeb's  - Added Mucinex  - Flutter and incentive spirometer- encouraged q4 hours today please  - follows with Dr. Remington Argueta and he has been consulted  - did well on Bipap  - stopped azithromycin and started on Doxy    Acute on Chronic hypoxic and hypercapnia respiratory failure  - Continue oxygen  - Wean To home dose of 3-4 L  - better, now off Bipap, on 8LNC, has been slow to wean and O2 sats drop significantly when on 6L  - repeat cxr and added 3% saline to induce cough, changed Dulera to BID Pulmicort    Acute on chronic diastolic heart failure  - cont torsemide, home dose, consider increasing   - diuresed with lasix well  - daily weights, I/o's (not accurate) (weight down 6lbs)  - monitor on tele    Essential hypertension  - Lisinopril    Hyperlipidemia, unspecified    Patient does not have CHF    Schizophrenia  - on seroquel, depakote, abilitfy  - flat affect    Obese  - BMI 43    Body mass index is 42.1 kg/m². The patient and / or the family were informed of the results of any tests, a time was given to answer questions, a plan was proposed and they agreed with plan.     DVT

## 2019-04-20 NOTE — PLAN OF CARE
Pt able to express presence/absence of pain and rate pain appropriately using numerical scale. Pain/discomfort being managed with PRN analgesics per MD orders (see MAR). Pain assessed every shift and after interventions. Patient free from falls this shift. Fall precautions in place at all times. Bed in lowest position with two side rails up and wheels locked. Call light within reach. Patient able and agreeable to contact for safety appropriately.     Problem: HEMODYNAMIC STATUS  Goal: Patient has stable vital signs and fluid balance  Outcome: Ongoing     Problem: HEMODYNAMIC STATUS  Goal: Patient has stable vital signs and fluid balance  Outcome: Ongoing     Problem: OXYGENATION/RESPIRATORY FUNCTION  Goal: Patient will maintain patent airway  Outcome: Ongoing     Problem: ACTIVITY INTOLERANCE/IMPAIRED MOBILITY  Goal: Mobility/activity is maintained at optimum level for patient  Outcome: Ongoing

## 2019-04-20 NOTE — PROGRESS NOTES
alone with aid services only 3x/wk  Treatment Diagnosis: Decreased activity tolerance  Prognosis: Good  Patient Education: reviewed call light and not getting up without assistance  REQUIRES PT FOLLOW UP: Yes  Activity Tolerance  Activity Tolerance: Patient limited by endurance       AM-PAC Score  AM-PAC Inpatient Mobility Raw Score : 18  AM-PAC Inpatient T-Scale Score : 43.63  Mobility Inpatient CMS 0-100% Score: 46.58  Mobility Inpatient CMS G-Code Modifier : CK          Goals  Short term goals  Time Frame for Short term goals: upon d/c - all goals ongoing 4-20  Short term goal 1: sup<>sit Ind  Short term goal 2: sit<>stand SBA  Short term goal 3: amb 80' with walker and SBA  Patient Goals   Patient goals : to get back home    Plan    Plan  Times per week: 3-5x/wk while in the hospital  Current Treatment Recommendations: Functional Mobility Training  Safety Devices  Type of devices: Call light within reach, Chair alarm in place, Left in chair, Nurse notified     Therapy Time   Individual Concurrent Group Co-treatment   Time In 1315         Time Out 1400         Minutes 45                 TRANG WEBER, PT

## 2019-04-21 LAB
ALBUMIN SERPL-MCNC: 3.3 G/DL (ref 3.4–5)
ANION GAP SERPL CALCULATED.3IONS-SCNC: 12 MMOL/L (ref 3–16)
BASOPHILS ABSOLUTE: 0 K/UL (ref 0–0.2)
BASOPHILS RELATIVE PERCENT: 0.2 %
BLOOD CULTURE, ROUTINE: NORMAL
BUN BLDV-MCNC: 18 MG/DL (ref 7–20)
CALCIUM SERPL-MCNC: 10.3 MG/DL (ref 8.3–10.6)
CHLORIDE BLD-SCNC: 96 MMOL/L (ref 99–110)
CO2: 34 MMOL/L (ref 21–32)
CREAT SERPL-MCNC: 0.5 MG/DL (ref 0.6–1.2)
EOSINOPHILS ABSOLUTE: 0 K/UL (ref 0–0.6)
EOSINOPHILS RELATIVE PERCENT: 0.3 %
GFR AFRICAN AMERICAN: >60
GFR NON-AFRICAN AMERICAN: >60
GLUCOSE BLD-MCNC: 99 MG/DL (ref 70–99)
HCT VFR BLD CALC: 42.5 % (ref 36–48)
HEMOGLOBIN: 13.6 G/DL (ref 12–16)
LYMPHOCYTES ABSOLUTE: 2.1 K/UL (ref 1–5.1)
LYMPHOCYTES RELATIVE PERCENT: 31.7 %
MAGNESIUM: 2.1 MG/DL (ref 1.8–2.4)
MCH RBC QN AUTO: 26.2 PG (ref 26–34)
MCHC RBC AUTO-ENTMCNC: 32 G/DL (ref 31–36)
MCV RBC AUTO: 81.9 FL (ref 80–100)
MONOCYTES ABSOLUTE: 0.5 K/UL (ref 0–1.3)
MONOCYTES RELATIVE PERCENT: 6.8 %
NEUTROPHILS ABSOLUTE: 4.1 K/UL (ref 1.7–7.7)
NEUTROPHILS RELATIVE PERCENT: 61 %
PDW BLD-RTO: 17.8 % (ref 12.4–15.4)
PHOSPHORUS: 2.8 MG/DL (ref 2.5–4.9)
PLATELET # BLD: 160 K/UL (ref 135–450)
PMV BLD AUTO: 8.7 FL (ref 5–10.5)
POTASSIUM SERPL-SCNC: 4 MMOL/L (ref 3.5–5.1)
PROCALCITONIN: 0.04 NG/ML (ref 0–0.15)
RBC # BLD: 5.18 M/UL (ref 4–5.2)
SODIUM BLD-SCNC: 142 MMOL/L (ref 136–145)
WBC # BLD: 6.7 K/UL (ref 4–11)

## 2019-04-21 PROCEDURE — 94640 AIRWAY INHALATION TREATMENT: CPT

## 2019-04-21 PROCEDURE — 2580000003 HC RX 258: Performed by: INTERNAL MEDICINE

## 2019-04-21 PROCEDURE — 2500000003 HC RX 250 WO HCPCS: Performed by: NURSE PRACTITIONER

## 2019-04-21 PROCEDURE — 94667 MNPJ CHEST WALL 1ST: CPT

## 2019-04-21 PROCEDURE — 2060000000 HC ICU INTERMEDIATE R&B

## 2019-04-21 PROCEDURE — 94762 N-INVAS EAR/PLS OXIMTRY CONT: CPT

## 2019-04-21 PROCEDURE — 80069 RENAL FUNCTION PANEL: CPT

## 2019-04-21 PROCEDURE — 83735 ASSAY OF MAGNESIUM: CPT

## 2019-04-21 PROCEDURE — 85025 COMPLETE CBC W/AUTO DIFF WBC: CPT

## 2019-04-21 PROCEDURE — 6370000000 HC RX 637 (ALT 250 FOR IP): Performed by: INTERNAL MEDICINE

## 2019-04-21 PROCEDURE — 6360000002 HC RX W HCPCS: Performed by: NURSE PRACTITIONER

## 2019-04-21 PROCEDURE — 6370000000 HC RX 637 (ALT 250 FOR IP): Performed by: NURSE PRACTITIONER

## 2019-04-21 PROCEDURE — 6360000002 HC RX W HCPCS: Performed by: INTERNAL MEDICINE

## 2019-04-21 PROCEDURE — 36415 COLL VENOUS BLD VENIPUNCTURE: CPT

## 2019-04-21 PROCEDURE — 2580000003 HC RX 258: Performed by: NURSE PRACTITIONER

## 2019-04-21 PROCEDURE — 84145 PROCALCITONIN (PCT): CPT

## 2019-04-21 PROCEDURE — 94668 MNPJ CHEST WALL SBSQ: CPT

## 2019-04-21 PROCEDURE — 94664 DEMO&/EVAL PT USE INHALER: CPT

## 2019-04-21 PROCEDURE — 94660 CPAP INITIATION&MGMT: CPT

## 2019-04-21 PROCEDURE — 2700000000 HC OXYGEN THERAPY PER DAY

## 2019-04-21 RX ORDER — BUSPIRONE HYDROCHLORIDE 10 MG/1
10 TABLET ORAL 3 TIMES DAILY
Qty: 30 TABLET | Refills: 0 | Status: SHIPPED | OUTPATIENT
Start: 2019-04-21 | End: 2019-05-23 | Stop reason: ALTCHOICE

## 2019-04-21 RX ORDER — DOXYCYCLINE HYCLATE 100 MG
100 TABLET ORAL 2 TIMES DAILY
Qty: 6 TABLET | Refills: 0 | Status: SHIPPED | OUTPATIENT
Start: 2019-04-21 | End: 2019-04-24

## 2019-04-21 RX ADMIN — BUSPIRONE HYDROCHLORIDE 10 MG: 10 TABLET ORAL at 14:09

## 2019-04-21 RX ADMIN — FAMOTIDINE 20 MG: 20 TABLET ORAL at 21:18

## 2019-04-21 RX ADMIN — ASPIRIN 81 MG: 81 TABLET, COATED ORAL at 09:18

## 2019-04-21 RX ADMIN — SODIUM CHLORIDE SOLN NEBU 3% 15 ML: 3 NEBU SOLN at 07:46

## 2019-04-21 RX ADMIN — DIVALPROEX SODIUM 500 MG: 500 TABLET, DELAYED RELEASE ORAL at 21:18

## 2019-04-21 RX ADMIN — IPRATROPIUM BROMIDE AND ALBUTEROL SULFATE 1 AMPULE: .5; 3 SOLUTION RESPIRATORY (INHALATION) at 17:03

## 2019-04-21 RX ADMIN — GUAIFENESIN 600 MG: 600 TABLET, EXTENDED RELEASE ORAL at 21:18

## 2019-04-21 RX ADMIN — IPRATROPIUM BROMIDE AND ALBUTEROL SULFATE 1 AMPULE: .5; 3 SOLUTION RESPIRATORY (INHALATION) at 19:40

## 2019-04-21 RX ADMIN — BUSPIRONE HYDROCHLORIDE 10 MG: 10 TABLET ORAL at 21:18

## 2019-04-21 RX ADMIN — LISINOPRIL 10 MG: 10 TABLET ORAL at 09:17

## 2019-04-21 RX ADMIN — DIVALPROEX SODIUM 500 MG: 500 TABLET, DELAYED RELEASE ORAL at 06:10

## 2019-04-21 RX ADMIN — ESCITALOPRAM OXALATE 10 MG: 10 TABLET ORAL at 09:17

## 2019-04-21 RX ADMIN — DIVALPROEX SODIUM 500 MG: 500 TABLET, DELAYED RELEASE ORAL at 14:09

## 2019-04-21 RX ADMIN — Medication 10 ML: at 09:18

## 2019-04-21 RX ADMIN — BUDESONIDE 500 MCG: 0.5 SUSPENSION RESPIRATORY (INHALATION) at 19:41

## 2019-04-21 RX ADMIN — CETIRIZINE HYDROCHLORIDE 5 MG: 10 TABLET, FILM COATED ORAL at 09:17

## 2019-04-21 RX ADMIN — Medication 10 ML: at 21:19

## 2019-04-21 RX ADMIN — IPRATROPIUM BROMIDE AND ALBUTEROL SULFATE 1 AMPULE: .5; 3 SOLUTION RESPIRATORY (INHALATION) at 07:45

## 2019-04-21 RX ADMIN — FAMOTIDINE 20 MG: 20 TABLET ORAL at 09:17

## 2019-04-21 RX ADMIN — OXYCODONE HYDROCHLORIDE AND ACETAMINOPHEN 500 MG: 500 TABLET ORAL at 21:18

## 2019-04-21 RX ADMIN — BUSPIRONE HYDROCHLORIDE 10 MG: 10 TABLET ORAL at 09:17

## 2019-04-21 RX ADMIN — ARIPIPRAZOLE 15 MG: 15 TABLET ORAL at 09:24

## 2019-04-21 RX ADMIN — ENOXAPARIN SODIUM 40 MG: 40 INJECTION SUBCUTANEOUS at 09:18

## 2019-04-21 RX ADMIN — TORSEMIDE 10 MG: 20 TABLET ORAL at 09:17

## 2019-04-21 RX ADMIN — PREDNISONE 40 MG: 20 TABLET ORAL at 09:17

## 2019-04-21 RX ADMIN — GUAIFENESIN 600 MG: 600 TABLET, EXTENDED RELEASE ORAL at 09:17

## 2019-04-21 RX ADMIN — OXYCODONE HYDROCHLORIDE AND ACETAMINOPHEN 500 MG: 500 TABLET ORAL at 09:17

## 2019-04-21 RX ADMIN — DOXYCYCLINE 100 MG: 100 INJECTION, POWDER, LYOPHILIZED, FOR SOLUTION INTRAVENOUS at 17:34

## 2019-04-21 RX ADMIN — SPIRONOLACTONE 25 MG: 25 TABLET ORAL at 09:17

## 2019-04-21 RX ADMIN — SODIUM CHLORIDE SOLN NEBU 3% 15 ML: 3 NEBU SOLN at 19:40

## 2019-04-21 RX ADMIN — METOPROLOL SUCCINATE 12.5 MG: 25 TABLET, EXTENDED RELEASE ORAL at 09:17

## 2019-04-21 RX ADMIN — BUDESONIDE 500 MCG: 0.5 SUSPENSION RESPIRATORY (INHALATION) at 07:46

## 2019-04-21 RX ADMIN — FERROUS SULFATE TAB EC 324 MG (65 MG FE EQUIVALENT) 324 MG: 324 (65 FE) TABLET DELAYED RESPONSE at 09:17

## 2019-04-21 RX ADMIN — ALBUTEROL SULFATE 2 PUFF: 90 AEROSOL, METERED RESPIRATORY (INHALATION) at 10:44

## 2019-04-21 RX ADMIN — DOXYCYCLINE 100 MG: 100 INJECTION, POWDER, LYOPHILIZED, FOR SOLUTION INTRAVENOUS at 06:09

## 2019-04-21 RX ADMIN — IPRATROPIUM BROMIDE AND ALBUTEROL SULFATE 1 AMPULE: .5; 3 SOLUTION RESPIRATORY (INHALATION) at 11:46

## 2019-04-21 RX ADMIN — QUETIAPINE FUMARATE 200 MG: 200 TABLET ORAL at 21:25

## 2019-04-21 ASSESSMENT — PAIN SCALES - GENERAL
PAINLEVEL_OUTOF10: 0
PAINLEVEL_OUTOF10: 0

## 2019-04-21 ASSESSMENT — PAIN DESCRIPTION - PROGRESSION
CLINICAL_PROGRESSION: NOT CHANGED

## 2019-04-21 NOTE — PROGRESS NOTES
Hospital Medicine Progress Note      Admit Date: 4/16/2019         Overnight Events: none    CC: F/U for SOB    HPI:   This is a 58-year-old female with a history of respiratory failure on oxygen 3-4 L at home, COPD, GERD, hyperlipidemia, hypertension, diabetes and schizophrenia who presented to the ED with complaints of increasing shortness of breath. She was found to have Metapneumoviris. Had acute hypercapnia with Co2 in the 80's and abnormal PH and was transferred to PCU for bipap. Pulmonology consulted. Oxygen has been slow to wean, meds changed yesterday, infiltrate noted on xray and added doxy. Home O2 3-4L, would like to get her back to this prior to discharge. Interval History/Subjective:   No cp, sob, nausea, vomiting, fever or chills. No sx .asking about discharge    Review of Systems:     Comprehensive ROS negative except as mentioned above. Past Medical History:        Diagnosis Date    Acute kidney failure (HCC)     Arthritis     Asthma     Congestive heart failure (HCC)     COPD (chronic obstructive pulmonary disease) (HCC)     Dependence on supplemental oxygen     GERD (gastroesophageal reflux disease)     Heart disease     Hyperlipidemia     Hypertension     Muscle weakness     Obesity     Schizophrenia ([facility]     Type 2 diabetes mellitus without complication ([facility]        Past Surgical History:        Procedure Laterality Date    ANKLE SURGERY      error    HERNIA REPAIR      HYSTERECTOMY         Allergies:  Pcn [penicillins]    Past medical and surgical history reviewed. Any changes have been noted.      Scheduled and prn Medications:    Scheduled Meds:   sodium chloride (Inhalant)  15 mL Nebulization BID    budesonide  0.5 mg Nebulization BID    doxycycline (VIBRAMYCIN) IV  100 mg Intravenous Q12H    busPIRone  10 mg Oral TID    guaiFENesin  600 mg Oral BID    cetirizine  5 mg Oral Daily    lisinopril  10 mg Oral Daily    spironolactone  25 mg Oral Daily    ARIPiprazole  15 mg Oral Daily    vitamin C  500 mg Oral BID    aspirin  81 mg Oral Daily    divalproex  500 mg Oral 3 times per day    escitalopram  10 mg Oral Daily    ferrous sulfate  324 mg Oral Daily with breakfast    metoprolol succinate  12.5 mg Oral Daily    QUEtiapine  200 mg Oral Nightly    torsemide  10 mg Oral Daily    sodium chloride flush  10 mL Intravenous 2 times per day    enoxaparin  40 mg Subcutaneous Daily    famotidine  20 mg Oral BID    nicotine  1 patch Transdermal Daily    ipratropium-albuterol  1 ampule Inhalation Q4H WA    predniSONE  40 mg Oral Daily     Continuous Infusions:  PRN Meds:.perflutren lipid microspheres, acetaminophen, albuterol sulfate HFA, clonazePAM, sodium chloride flush, magnesium hydroxide, ondansetron    PHYSICAL EXAM:  /80   Pulse 99   Temp 98.2 °F (36.8 °C) (Oral)   Resp 20   Ht 5' 9\" (1.753 m)   Wt 289 lb 11 oz (131.4 kg)   SpO2 94%   BMI 42.78 kg/m²       Intake/Output Summary (Last 24 hours) at 4/21/2019 1040  Last data filed at 4/21/2019 0916  Gross per 24 hour   Intake 600 ml   Output 450 ml   Net 150 ml       General: Alert and oriented. Resting in bed in no apparent distress. Obese, flat affect today  HEENT: Normocephalic. Atraumatic. Pupils equal and reactive. EOM intact. Oral mucosa pink/moist/intact. Neck: Supple. Symmetrical. Trachea midline. Lungs: Clear to auscultation all fields Respirations even and unlabored. Chest: Exam unremarkable. Cardiac: S1/S2 noted. Regular Rhythm and rate. Abdomen/GI: Soft. Non-tender. Non-distended. BS+. Extremities: PP+. Atraumatic. No redness/cyanosis/edema noted. Brisk cap refill. Skin: Dry and intact. No lesions noted. Neuro: Grossly intact. No focal deficits noted.      LABS:    Lab Results   Component Value Date    WBC 6.7 04/21/2019    HGB 13.6 04/21/2019    HCT 42.5 04/21/2019    MCV 81.9 04/21/2019     04/21/2019    LYMPHOPCT 31.7 04/21/2019    RBC 5.18 04/21/2019    MCH 26.2 04/21/2019    MCHC 32.0 04/21/2019    RDW 17.8 (H) 04/21/2019       Lab Results   Component Value Date    CREATININE 0.5 (L) 04/21/2019    BUN 18 04/21/2019     04/21/2019    K 4.0 04/21/2019    CL 96 (L) 04/21/2019    CO2 34 (H) 04/21/2019       Lab Results   Component Value Date    MG 2.10 04/21/2019       Lab Results   Component Value Date    ALT 8 (L) 04/16/2019    AST 19 04/16/2019    ALKPHOS 84 04/16/2019    BILITOT 0.3 04/16/2019        No flowsheet data found. Imaging:  XR CHEST STANDARD (2 VW)   Final Result   Resolution of pulmonary vascular congestion. Moderate right basilar opacity   probably atelectasis although pneumonia possible. XR CHEST STANDARD (2 VW)   Final Result   Moderate CHF         XR CHEST STANDARD (2 VW)   Final Result   1. Cardiomegaly and pulmonary edema with small left pleural effusion   suggesting CHF. 2. Patchy bibasilar opacities may be atelectasis or infiltrates.              Assessment & Plan:      COPD exacerbation  - likely secondary to [place]  - continue prednisone  - Continue scheduled DuoNeb's  - Added Mucinex  - Flutter and incentive spirometer- encouraged q4 hours today please  - follows with Dr. Liliane Koehler and he has been consulted  - did well on Bipap  -cont Doxy  - cont holding dulera and cont pulmicort today with above and will likely be ok to discharge in am    Acute on Chronic hypoxic and hypercapnia respiratory failure  - Continue oxygen  - Wean To home dose of 3-4 L  - better, now off Bipap, on 8LNC, has been slow to wean and O2 sats drop significantly when on 6L  - better today, now on 6L NC    Acute on chronic diastolic heart failure  - cont torsemide, home dose, consider increasing   - diuresed with lasix well  - daily weights, I/o's (not accurate) (weight down 6lbs)  - monitor on tele  - resolved    Essential hypertension  - Lisinopril    Hyperlipidemia, unspecified    Patient does not have CHF    Schizophrenia  - on seroquel, depakote, abilitfy  - flat affect    Obese  - BMI 43    Body mass index is 42.78 kg/m². The patient and / or the family were informed of the results of any tests, a time was given to answer questions, a plan was proposed and they agreed with plan. DVT prophylaxis: [x] Lovenox  [] SQ Heparin  [] SCDs because of  [] warfarin/oral direct thrombin inhibitor [] Encourage ambulation    GI prophylaxis: [] PPI/[id number]  [x] not indicated    Probiotic if on abx: [] Yes [] No [x] Not Indicated    Diet: DIET CARDIAC; Low Sodium (2 GM);  Daily Fluid Restriction: 1500 ml    Consults:  IP CONSULT TO SPIRITUAL SERVICES  IP CONSULT TO SOCIAL WORK  IP CONSULT TO PULMONOLOGY    Disposition:  [] Home  [] Home with home health [] Rehab [] Psych [] SNF  [] LTAC  [] Long term nursing home or group home [] Transfer to ICU  [] Transfer to PCU [] Other:    Code Status: Full Code    ELOS: tbd      ANNE MARIE Adams CNP  04/21/19

## 2019-04-21 NOTE — PLAN OF CARE
Problem: Falls - Risk of:  Goal: Will remain free from falls  Description  Will remain free from falls  4/21/2019 0352 by Daniel Martínez RN  Outcome: Ongoing  4/20/2019 1623 by Christiano Grene RN  Outcome: Ongoing  Goal: Absence of physical injury  Description  Absence of physical injury  Outcome: Ongoing     Problem: Breathing Pattern - Ineffective:  Goal: Ability to achieve and maintain a regular respiratory rate will improve  Description  Ability to achieve and maintain a regular respiratory rate will improve  Outcome: Ongoing     Problem: OXYGENATION/RESPIRATORY FUNCTION  Goal: Patient will maintain patent airway  4/21/2019 0352 by Daniel Martínez RN  Outcome: Ongoing  4/20/2019 1623 by Christiano Green RN  Outcome: Ongoing  Goal: Patient will achieve/maintain normal respiratory rate/effort  Description  Respiratory rate and effort will be within normal limits for the patient  Outcome: Ongoing     Problem: HEMODYNAMIC STATUS  Goal: Patient has stable vital signs and fluid balance  Outcome: Ongoing     Problem: FLUID AND ELECTROLYTE IMBALANCE  Goal: Fluid and electrolyte balance are achieved/maintained  Outcome: Ongoing     Problem: ACTIVITY INTOLERANCE/IMPAIRED MOBILITY  Goal: Mobility/activity is maintained at optimum level for patient  Outcome: Ongoing

## 2019-04-21 NOTE — PLAN OF CARE
Problem: Falls - Risk of:  Goal: Will remain free from falls  Description  Will remain free from falls  Outcome: Ongoing  Note:   Fall risk assessment completed every shift. All precautions in place. Pt has call light within reach at all times. Room clear of clutter. Pt aware to call for assistance when getting up. Problem: OXYGENATION/RESPIRATORY FUNCTION  Goal: Patient will maintain patent airway  Outcome: Ongoing  Note:   Patient's airway has maintained patency. Patient's breath sounds are diminished. No c/o SOB. Patient able to express concerns. Will continue to monitor. Problem: FLUID AND ELECTROLYTE IMBALANCE  Goal: Fluid and electrolyte balance are achieved/maintained  Outcome: Ongoing  Note:   PO and IV fluid intake closely monitored. Electrolytes assessed and compared. Family included in patient care and education throughout. Will continue to monitor.

## 2019-04-22 VITALS
SYSTOLIC BLOOD PRESSURE: 121 MMHG | TEMPERATURE: 98.3 F | DIASTOLIC BLOOD PRESSURE: 78 MMHG | OXYGEN SATURATION: 96 % | HEIGHT: 69 IN | HEART RATE: 93 BPM | BODY MASS INDEX: 42.87 KG/M2 | RESPIRATION RATE: 21 BRPM | WEIGHT: 289.46 LBS

## 2019-04-22 LAB — CULTURE, BLOOD 2: NORMAL

## 2019-04-22 PROCEDURE — 94668 MNPJ CHEST WALL SBSQ: CPT

## 2019-04-22 PROCEDURE — 6370000000 HC RX 637 (ALT 250 FOR IP): Performed by: INTERNAL MEDICINE

## 2019-04-22 PROCEDURE — 94664 DEMO&/EVAL PT USE INHALER: CPT

## 2019-04-22 PROCEDURE — 97530 THERAPEUTIC ACTIVITIES: CPT

## 2019-04-22 PROCEDURE — 2580000003 HC RX 258: Performed by: INTERNAL MEDICINE

## 2019-04-22 PROCEDURE — 2580000003 HC RX 258: Performed by: NURSE PRACTITIONER

## 2019-04-22 PROCEDURE — 2700000000 HC OXYGEN THERAPY PER DAY

## 2019-04-22 PROCEDURE — 6360000002 HC RX W HCPCS: Performed by: NURSE PRACTITIONER

## 2019-04-22 PROCEDURE — 94640 AIRWAY INHALATION TREATMENT: CPT

## 2019-04-22 PROCEDURE — 6370000000 HC RX 637 (ALT 250 FOR IP): Performed by: NURSE PRACTITIONER

## 2019-04-22 PROCEDURE — 2500000003 HC RX 250 WO HCPCS: Performed by: NURSE PRACTITIONER

## 2019-04-22 PROCEDURE — 97116 GAIT TRAINING THERAPY: CPT | Performed by: PHYSICAL THERAPIST

## 2019-04-22 PROCEDURE — 99232 SBSQ HOSP IP/OBS MODERATE 35: CPT | Performed by: INTERNAL MEDICINE

## 2019-04-22 PROCEDURE — 94762 N-INVAS EAR/PLS OXIMTRY CONT: CPT

## 2019-04-22 PROCEDURE — 6360000002 HC RX W HCPCS: Performed by: INTERNAL MEDICINE

## 2019-04-22 RX ORDER — LISINOPRIL 2.5 MG/1
2.5 TABLET ORAL DAILY
COMMUNITY
End: 2019-04-26

## 2019-04-22 RX ADMIN — SPIRONOLACTONE 25 MG: 25 TABLET ORAL at 09:35

## 2019-04-22 RX ADMIN — TORSEMIDE 10 MG: 20 TABLET ORAL at 09:34

## 2019-04-22 RX ADMIN — OXYCODONE HYDROCHLORIDE AND ACETAMINOPHEN 500 MG: 500 TABLET ORAL at 09:33

## 2019-04-22 RX ADMIN — IPRATROPIUM BROMIDE AND ALBUTEROL SULFATE 1 AMPULE: .5; 3 SOLUTION RESPIRATORY (INHALATION) at 11:55

## 2019-04-22 RX ADMIN — BUDESONIDE 500 MCG: 0.5 SUSPENSION RESPIRATORY (INHALATION) at 08:17

## 2019-04-22 RX ADMIN — FAMOTIDINE 20 MG: 20 TABLET ORAL at 09:33

## 2019-04-22 RX ADMIN — GUAIFENESIN 600 MG: 600 TABLET, EXTENDED RELEASE ORAL at 09:34

## 2019-04-22 RX ADMIN — PREDNISONE 40 MG: 20 TABLET ORAL at 09:33

## 2019-04-22 RX ADMIN — ARIPIPRAZOLE 15 MG: 15 TABLET ORAL at 09:41

## 2019-04-22 RX ADMIN — DOXYCYCLINE 100 MG: 100 INJECTION, POWDER, LYOPHILIZED, FOR SOLUTION INTRAVENOUS at 06:23

## 2019-04-22 RX ADMIN — IPRATROPIUM BROMIDE AND ALBUTEROL SULFATE 1 AMPULE: .5; 3 SOLUTION RESPIRATORY (INHALATION) at 17:05

## 2019-04-22 RX ADMIN — BUSPIRONE HYDROCHLORIDE 10 MG: 10 TABLET ORAL at 09:35

## 2019-04-22 RX ADMIN — FERROUS SULFATE TAB EC 324 MG (65 MG FE EQUIVALENT) 324 MG: 324 (65 FE) TABLET DELAYED RESPONSE at 09:33

## 2019-04-22 RX ADMIN — IPRATROPIUM BROMIDE AND ALBUTEROL SULFATE 1 AMPULE: .5; 3 SOLUTION RESPIRATORY (INHALATION) at 08:17

## 2019-04-22 RX ADMIN — CETIRIZINE HYDROCHLORIDE 5 MG: 10 TABLET, FILM COATED ORAL at 09:35

## 2019-04-22 RX ADMIN — METOPROLOL SUCCINATE 12.5 MG: 25 TABLET, EXTENDED RELEASE ORAL at 09:35

## 2019-04-22 RX ADMIN — DIVALPROEX SODIUM 500 MG: 500 TABLET, DELAYED RELEASE ORAL at 06:22

## 2019-04-22 RX ADMIN — ENOXAPARIN SODIUM 40 MG: 40 INJECTION SUBCUTANEOUS at 09:49

## 2019-04-22 RX ADMIN — SODIUM CHLORIDE SOLN NEBU 3% 15 ML: 3 NEBU SOLN at 08:17

## 2019-04-22 RX ADMIN — ASPIRIN 81 MG: 81 TABLET, COATED ORAL at 09:33

## 2019-04-22 RX ADMIN — Medication 10 ML: at 09:33

## 2019-04-22 RX ADMIN — ESCITALOPRAM OXALATE 10 MG: 10 TABLET ORAL at 09:33

## 2019-04-22 RX ADMIN — LISINOPRIL 10 MG: 10 TABLET ORAL at 09:35

## 2019-04-22 ASSESSMENT — PAIN SCALES - GENERAL
PAINLEVEL_OUTOF10: 0
PAINLEVEL_OUTOF10: 0

## 2019-04-22 ASSESSMENT — PULMONARY FUNCTION TESTS: PEFR_L/MIN: 20

## 2019-04-22 NOTE — PROGRESS NOTES
Discharge orders given with teach back done , meds sent home with pt ,o2 tank with pt 4litters of o2 per continuous nasal cannula  Rans cab escort to home

## 2019-04-22 NOTE — PROGRESS NOTES
Alerted to patient by CDS HF list.  Patient presented with SOB. Patient was initially treated with IV bolus Lasix. Pulm was consulted and patient is being treated for COPD exac. She does have history of diastolic HF and follows with [facility]. She was last seen in office on 10/22/18 by Dr Kartik San. At that time, her weight was noted to be up 20# (she was 310#) and he changed her torsemide to 20mg BID. Patient has been maintained on Demadex 10 QD while IP. HF measures were implemented: daily weights, I/O, and sodium / fluid restricted diet. HF careplan is current. HF instructions have been added to AVS.  Patient has received >60 mins of HF education. Patient already had existing appt which can serve as follow up Apr 26 at 1pm with Dr Kartik San. Pharmacy was notified of need for discharge med rec.

## 2019-04-22 NOTE — DISCHARGE SUMMARY
Hospital Medicine Discharge Summary      Patient ID: Parker Gibson      Patient's PCP: Michelle Ayala MD    Admit Date: 4/16/2019     Discharge Date: 4/22/19    Admitting Physician: Maycol Guzman MD     Discharge Provider: ANNE MARIE Dawn NP     Discharge Diagnoses: Active Hospital Problems    Diagnosis Date Noted    Hypoxia [R09.02]     Viral bronchitis [J20.8]     COPD exacerbation ([address] [J44.1] 10/23/2016    Tobacco abuse [Z72.0] 10/23/2016     Disposition:  [x] Home  [] Home with home health [] Rehab [] Psych [] SNF  [] LTAC  [] Long term nursing home or group home [] Transfer to ICU  [] Transfer to PCU [] Other:    Discharge Instructions/Follow-up:      Please call and schedule an appointment with your Primary Care Provider Dr. Michelle Ayala MD at [phone] for a follow up visit within 1 week. Stop smoking    HH for continued therapies    Take medications as prescribed. Return to the emergency room for fever, cough, chest pain or worsening symptoms. Houston Lockett MD  [address]  [phone]    Call in 1 week      [facility], [address] [facility] [address]  [phone]    In 1 week      Harleenman Kline, [address] [place] [address]  [phone]    Schedule an appointment as soon as possible for a visit in 1 week      PCP/SNF to follow up: As above    Discharge Condition: Stable      Code Status:  Full Code     Activity: activity as tolerated    Diet: DIET CARDIAC; Low Sodium (2 GM);  Daily Fluid Restriction: 1500 ml     Discharge Medications:     Current Discharge Medication List           Details   busPIRone (BUSPAR) 10 MG tablet Take 1 tablet by mouth 3 times daily  Qty: 30 tablet, Refills: 0      doxycycline hyclate (VIBRA-TABS) 100 MG tablet Take 1 tablet by mouth 2 times daily for 3 days  Qty: 6 tablet, Refills: 0      guaiFENesin (MUCINEX) 600 MG extended release tablet Take 1 tablet by mouth 2 times daily for 7 days  Qty: 14 tablet, Refills: 0      predniSONE (DELTASONE) 20 MG tablet Take 2 tablets by mouth daily for 5 days  Qty: 10 tablet, Refills: 0              Details   torsemide (DEMADEX) 20 MG tablet TAKE 1 TABLET BY MOUTH 2 TIMES DAILY *START THIS WHEN FUROSEMIDE PRESCRIPTION IS COMPLETE*  Qty: 60 tablet, Refills: 3      Umeclidinium Bromide (INCRUSE ELLIPTA) 62.5 MCG/INH AEPB Inhale 1 puff into the lungs daily  Qty: 1 each, Refills: 6    Associated Diagnoses: COPD, severe (HCC)      ! ! albuterol sulfate HFA (PROAIR HFA) 108 (90 Base) MCG/ACT inhaler Inhale 2 puffs into the lungs every 4 hours as needed for Wheezing or Shortness of Breath  Qty: 1 Inhaler, Refills: 11    Associated Diagnoses: COPD, severe ([facility]      ! ! albuterol (PROVENTIL) (2.5 MG/3ML) 0.083% nebulizer solution Take 3 mLs by nebulization every 4 hours as needed for Wheezing  Qty: 360 vial, Refills: 11    Associated Diagnoses: COPD, severe (HCC)      vitamin D (CHOLECALCIFEROL) 1000 UNIT TABS tablet Take 1,000 Units by mouth daily      clonazePAM (KLONOPIN) 0.5 MG tablet Take 0.5 mg by mouth 2 times daily as needed. Yahir Pal acetaminophen (TYLENOL) 500 MG tablet Take 500 mg by mouth every 6 hours as needed for Pain      ondansetron (ZOFRAN-ODT) 4 MG disintegrating tablet Take 4 mg by mouth every 8 hours as needed for Nausea or Vomiting      polyethylene glycol (GLYCOLAX) powder Take 17 g by mouth daily      sodium chloride 1 g tablet Take 1 g by mouth daily      Ascorbic Acid (VITAMIN C) 500 MG tablet Take 500 mg by mouth 2 times daily      metoprolol succinate (TOPROL XL) 25 MG extended release tablet Take 0.5 tablets by mouth daily  Qty: 30 tablet, Refills: 5    Associated Diagnoses: Acute on chronic diastolic heart failure ([facility]  Essential hypertension      aspirin 81 MG tablet Take 81 mg by mouth daily      divalproex (DEPAKOTE) 500 MG DR tablet Take 500 mg by mouth conjunction with any daily progress note from day of discharge. Hospital Course: The patient is a 69-year-old female with a history of chronic respiratory failure on oxygen 3-4 L at baseline, COPD, GERD, hyperlipidemia, hypertension, diabetes and schizophrenia, who presented to the ED with complaints of increasing shortness of breath. Symptoms began approximately 1 week prior to admission. She was found to have Metapneumoviris. The patient had acute hypercapnia with Co2 in the 80's and abnormal PH. She was transferred to PCU for bipap. Pulmonology was consulted. The patient was slowly able to wean back to her home oxygen. At this time, the patient is able to discharge to home with oral prednisone and doxy. She will need to f/u with pulmonology and her PCP as above. Exam:     /77   Pulse 81   Temp 98.4 °F (36.9 °C) (Oral)   Resp 20   Ht 5' 9\" (1.753 m)   Wt 289 lb 7.4 oz (131.3 kg)   SpO2 95%   BMI 42.75 kg/m²     General: Alert and oriented. Sitting up at EOB in NAD. Pleasant and cooperative. Morbidly obese. HEENT: Normocephalic. Atraumatic. Pupils equal and reactive. EOM intact. Oral mucosa pink/moist/intact. O2 per NC. Neck: Supple. Symmetrical. Trachea midline. Lungs: Clear to auscultation bilaterally. Respirations even and unlabored. Chest: Exam unremarkable. Cardiac: S1/S2 noted. Regular Rhythm and rate. Abdomen/GI: Soft. Non-tender. Non-distended. BS+. Extremities: PP+. Atraumatic. No redness/cyanosis/edema noted. Brisk cap refill. Skin: Dry and intact. No lesions noted. Neuro: Grossly intact. No focal deficits noted. Consults:     IP CONSULT TO SPIRITUAL SERVICES  IP CONSULT TO SOCIAL WORK  IP CONSULT TO PULMONOLOGY    Significant Diagnostic Studies:     XR CHEST STANDARD (2 VW)   Final Result   Resolution of pulmonary vascular congestion. Moderate right basilar opacity   probably atelectasis although pneumonia possible.          XR CHEST STANDARD (2 VW)   Final Result

## 2019-04-22 NOTE — PROGRESS NOTES
[facility]    Respiratory Therapy   Home Oxygen Evaluation        Name: John Ellis  Medical Record Number: [id number]  Age: 59 y.o. Gender:  female   : 1960  Today's date: 2019  Room: Q0N-6514/5114-01      Assessment        /78   Pulse 93   Temp 98.3 °F (36.8 °C) (Oral)   Resp 21   Ht 5' 9\" (1.753 m)   Wt 289 lb 7.4 oz (131.3 kg)   SpO2 90%   BMI 42.75 kg/m²     Patient Active Problem List   Diagnosis    Blurred vision    Cataract of both eyes    Progressive high myopia    Acute CHF (congestive heart failure) (HCC)    Encephalopathy acute    CO2 narcosis    Respiratory acidosis    Hypotension    Acute respiratory failure with hypoxia and hypercapnia (HCC)    Obesity, morbid (HCC)    Tobacco abuse    COPD exacerbation (HCC)    Schizoaffective disorder (HCC)    ARF (acute renal failure) (HCC)    Microcytosis    PNA (pneumonia)    Abnormal ECG    RICHARD ([facility] kidney [facility]) ([facility]    Acute on chronic respiratory failure with hypoxia and hypercapnia (HCC)    Acute pulmonary edema (HCC)    Acute on chronic diastolic heart failure (HCC)    COPD, severe (HCC)    Viral bronchitis    Hypoxia       Social History:  Social History     Tobacco Use    Smoking status: Heavy Tobacco Smoker     Packs/day: 1.00     Years: 40.00     Pack years: 40.00    Smokeless tobacco: Never Used    Tobacco comment: 6 cig QD. Substance Use Topics    Alcohol use: Yes    Drug use: No       Patient Room Air saturation at rest 80  %  Patient Room Air saturation upon ambulation 80 %    Oxygen saturations of 88% or less on RA qualifies patient for Home Oxygen    Patient resting on 4  lmp  with an oxygen saturation of  94 %     Patient ambulated on 4 lpm with an oxygen saturation of 90%    Qualifying patient for home oxygen with ambulation and continuous flow  @ 4 lpm.      In your clinical assessment does the Patient Require Portable Oxygen Tanks?     Yes

## 2019-04-22 NOTE — PROGRESS NOTES
sit: Independent  Ambulation  Ambulation?: Yes  Ambulation 1  Surface: level tile  Device: No Device  Assistance: Modified Independent  Quality of Gait: up in room walking independently  Distance: in room distances  Comments: spO2 remains 94-97% on 6 liters high flow     Balance  Sitting - Static: Good  Sitting - Dynamic: Good  Standing - Static: Fair;+;Good;-  Standing - Dynamic: Fair;+                           Assessment   Assessment: pt appears close to her baseline except for O2 needs; feel pt will be safe to return home with PRN assist and either home PT or return to pulmonary rehab (pt prefers pulmonary rehab however will need to have portable O2 tank that will support her need for 6 liters high flow to go out to pulmonary rehab)  Treatment Diagnosis: Decreased activity tolerance  Prognosis: Good  REQUIRES PT FOLLOW UP: No  Activity Tolerance  Activity Tolerance: Patient limited by endurance       AM-PAC Score  AM-PAC Inpatient Mobility Raw Score : 23  AM-PAC Inpatient T-Scale Score : 56.93  Mobility Inpatient CMS 0-100% Score: 11.2  Mobility Inpatient CMS G-Code Modifier : CI          Goals  Short term goals  Time Frame for Short term goals: upon d/c - all goals ongoing 4-20  Short term goal 1: sup<>sit Ind - met  Short term goal 2: sit<>stand SBA - met  Short term goal 3: amb 80' with walker and SBA - met except distance  Patient Goals   Patient goals : to get back home    Plan    Plan  Times per week: 3-5x/wk while in the hospital  Current Treatment Recommendations: Functional Mobility Training  Plan Comment: no further acute PT indicated  Safety Devices  Type of devices: Call light within reach     Therapy Time   Individual Concurrent Group Co-treatment   Time In 1050         Time Out 1111         Minutes 21              Minoo COREA, PT, 0565    MINOO WEBER, PT

## 2019-04-22 NOTE — PROGRESS NOTES
Pharmacy Heart Failure Medication Reconciliation Note    Pt discharged from [facility] today after admission for respiratory failure. Roxane Wright has a diagnosis of chronic diastolic heart failure (last EF = 55% on 4/16/19). Not in acute heart failure this admission per physician documentation    Patient taking an ACEI / ARB / Entresto:  Yes     Patient taking a BETA BLOCKER:  Yes  Patient taking a LOOP DIURETIC: Yes  Patient taking a ALDOSTERONE RECEPTOR ANTAGONIST: Yes    Corrections to discharge medications include:  Removed duplicate albuterol MDIs  Clarified Lisinopril to be 2.5mg daily per [facility] pharmacy (last filled 4/16/19)  Clarified per patient that she takes Klonopin at home, not Ativan. Duplicate medications. Discharge Medications:         Medication List        START taking these medications      busPIRone 10 MG tablet  Commonly known as:  BUSPAR  Take 1 tablet by mouth 3 times daily     doxycycline hyclate 100 MG tablet  Commonly known as:  VIBRA-TABS  Take 1 tablet by mouth 2 times daily for 3 days     guaiFENesin 600 MG extended release tablet  Commonly known as:  MUCINEX  Take 1 tablet by mouth 2 times daily for 7 days     predniSONE 20 MG tablet  Commonly known as:  DELTASONE  Take 2 tablets by mouth daily for 5 days            CHANGE how you take these medications      fluticasone-salmeterol 250-50 MCG/DOSE Aepb  Commonly known as:  ADVAIR DISKUS  Inhale 1 puff into the lungs every 12 hours  What changed:  Another medication with the same name was removed. Continue taking this medication, and follow the directions you see here. tiotropium 18 MCG inhalation capsule  Commonly known as:  SPIRIVA HANDIHALER  Inhale 1 capsule into the lungs daily  What changed:  Another medication with the same name was removed. Continue taking this medication, and follow the directions you see here.      Umeclidinium Bromide 62.5 MCG/INH Aepb  Commonly known as:  INCRUSE ELLIPTA  Inhale 1 puff into the lungs daily  What changed:  Another medication with the same name was removed. Continue taking this medication, and follow the directions you see here.             CONTINUE taking these medications      acetaminophen 500 MG tablet  Commonly known as:  TYLENOL     * albuterol sulfate  (90 Base) MCG/ACT inhaler  Commonly known as:  PROAIR HFA  Inhale 2 puffs into the lungs every 4 hours as needed for Wheezing or Shortness of Breath     * albuterol (2.5 MG/3ML) 0.083% nebulizer solution  Commonly known as:  PROVENTIL  Take 3 mLs by nebulization every 4 hours as needed for Wheezing     ARIPiprazole 5 MG tablet  Commonly known as:  ABILIFY     aspirin 81 MG tablet     clonazePAM 0.5 MG tablet  Commonly known as:  KLONOPIN     divalproex 500 MG DR tablet  Commonly known as:  DEPAKOTE     ferrous sulfate 325 (65 Fe) MG tablet     fluticasone 50 MCG/ACT nasal spray  Commonly known as:  FLONASE  1 spray by Nasal route daily     LEXAPRO 10 MG tablet  Generic drug:  escitalopram     lisinopril 2.5 MG tablet  Commonly known as:  PRINIVIL;ZESTRIL     loratadine 10 MG tablet  Commonly known as:  CLARITIN     metoprolol succinate 25 MG extended release tablet  Commonly known as:  TOPROL XL  Take 0.5 tablets by mouth daily     ondansetron 4 MG disintegrating tablet  Commonly known as:  ZOFRAN-ODT     polyethylene glycol powder  Commonly known as:  GLYCOLAX     QUEtiapine 200 MG tablet  Commonly known as:  SEROQUEL     ranitidine 150 MG capsule  Commonly known as:  ZANTAC     sodium chloride 1 g tablet     spironolactone 25 MG tablet  Commonly known as:  ALDACTONE  Take 1 tablet by mouth daily     torsemide 20 MG tablet  Commonly known as:  DEMADEX  TAKE 1 TABLET BY MOUTH 2 TIMES DAILY *START THIS WHEN FUROSEMIDE PRESCRIPTION IS COMPLETE*     vitamin C 500 MG tablet  Commonly known as:  ASCORBIC ACID     vitamin D 1000 UNIT Tabs tablet  Commonly known as:  CHOLECALCIFEROL           * This list has 2 medication(s) that are the same as other medications prescribed for you. Read the directions carefully, and ask your doctor or other care provider to review them with you.                    Where to Get Your Medications        These medications were sent to [address] [place] [address] [facility] , [address] [place] [address] & [facility]  [address] [facility] [address], [address]      Phone:  [phone]   busPIRone 10 MG tablet  doxycycline hyclate 100 MG tablet  guaiFENesin 600 MG extended release tablet  predniSONE 20 MG tablet         Minoo Goins, PharmD  Heart Failure Discharge Medication Reconciliation Program  [phone]

## 2019-04-22 NOTE — PROGRESS NOTES
Occupational Therapy  Patient presents in the room up ad bruna managing O2 line upon arrival. Pt reports that she just completed toileting and grooming prior to therapist arrival. Pt presents on 6L O2 NC and SpO2 at 94-97%. Pt independent with fxl mobility and ADLs without AD. Pt will be safe to return home with [address] [place] [address] or pulmonary rehab dependent upon pt tolerance when medically ready for d/c. No further acute OT needs at this time.      Thank you,     Time: 9 mins     Amber Oswald OTR/L #[id number]

## 2019-04-22 NOTE — DISCHARGE INSTR - COC
Continuity of Care Form    Patient Name: Sehr Phillips   :  1960  MRN:  [id number]    Admit date:  2019  Discharge date:  2019    Code Status Order: Full Code   Advance Directives:   [facility] Documentation     Date/Time Healthcare Directive Type of Healthcare Directive Copy in [facility] [address] Agent's Name Healthcare Agent's Phone Number    19 1427  No, patient does not have an advance directive for healthcare treatment -- -- -- -- --    19 0001  No, patient does not have an advance directive for healthcare treatment -- -- -- -- --          Admitting Physician:  Steven Daniel MD  PCP: Emmett Parkinson MD    Discharging Nurse:james meléndez  [address] Unit/Room#: E8A-1595/3099-11  Discharging Unit Phone Number:[phone]    Emergency Contact:   Extended Emergency Contact Information  Primary Emergency Contact:  Xander Paulino. Phone: [phone]  Relation: Child  Secondary Emergency Contact: [address] Phone: [phone]  Relation: Parent    Past Surgical History:  Past Surgical History:   Procedure Laterality Date    ANKLE SURGERY      error    HERNIA REPAIR      HYSTERECTOMY         Immunization History:   Immunization History   Administered Date(s) Administered    Influenza Vaccine, unspecified formulation 2011, 2012, 10/23/2013, 10/21/2014, 2015, 10/30/2018    Influenza, [place], 3 yrs and older, IM, PF (Fluzone 3 yrs and older or Afluria 5 yrs and older) 2017    Pneumococcal 13-valent Conjugate ([id number]) 2017    Pneumococcal Polysaccharide ([id number]) 2011    Tdap (Boostrix, Adacel) 2011    Tetanus 2010       Active Problems:  Patient Active Problem List   Diagnosis Code    Blurred vision H53.8    Cataract of both eyes H26.9    Progressive high myopia H44.20    Acute CHF (congestive heart failure) (HCC) I50.9    Encephalopathy acute 720 ml     I/O last 3 completed shifts: In: 1080 [P.O.:1080]  Out: 0     Safety Concerns: At Risk for Falls    Impairments/Disabilities:      None    Nutrition Therapy:  Current Nutrition Therapy:   - Oral Diet:  Cardiac and Low Sodium (2gm)    Routes of Feeding: Oral  Liquids: Thin Liquids  Daily Fluid Restriction: yes - amount 1500  Last Modified Barium Swallow with Video (Video Swallowing Test): not done    Treatments at the Time of Hospital Discharge:   Respiratory Treatments:   Oxygen Therapy:  is on oxygen at 6 L/min per nasal cannula. Ventilator:    - No ventilator support    Rehab Therapies: Physical Therapy and Occupational Therapy  Weight Bearing Status/Restrictions: No weight bearing restirctions  Other Medical Equipment (for information only, NOT a DME order):  wheelchair  Other Treatments: ***    Patient's personal belongings (please select all that are sent with patient):  None    RN SIGNATURE:  Electronically signed by Piter De La Torre RN on 4/22/19 at 11:45 AM    CASE MANAGEMENT/SOCIAL WORK SECTION    Inpatient Status Date:  4/16/19    Readmission Risk Assessment Score:  Readmission Risk              Risk of Unplanned Readmission:        23           Discharging to Facility/ Agency   · Name:  [facility]   · Address:    · Phone:  [phone]  · Fax:  [phone]    Dialysis Facility (if applicable)   · Name:  · Address:  · Dialysis Schedule:  · Phone:  · Fax:    / signature: Electronically signed by Dolores Epps on 4/22/2019 at 3:55 PM    PHYSICIAN SECTION    Prognosis: Fair    Condition at Discharge: Stable    Rehab Potential (if transferring to Rehab): Fair    Recommended Labs or Other Treatments After Discharge: [facility] for continued PT/OT. Physician Certification: I certify the above information and transfer of Jose Roberto Ruiz  is necessary for the continuing treatment of the diagnosis listed and that she requires Home Care for less 30 days. Update Admission H&P: No change in H&P    PHYSICIAN SIGNATURE: Electronically signed by ANNE MARIE Melendez NP on 4/22/19 at 12:16 Sonya Gupta MD

## 2019-04-22 NOTE — PROGRESS NOTES
Pulmonary Progress Note    Date of Admission: 4/16/2019   LOS: 6 days     Chief Complaint   Patient presents with    Shortness of Breath    Nasal Congestion     x 1 week       Assessment:     Acute on chronic Hypoxemic and hypercapnic Respiratory Failure with SAO2 <90% on Room Air, PCO2 >60  Acute Exacerbation of COPD, due to  Human Metapneumovirus Acute Bronchitis  COPD      Plan:      -Wean supplemental oxygen to goal saturation of >90%, on 3-4L/m @home  -pred 40mg x 5 days then stop  -duonebs dulera  -azithromycin x 5 days  -PT/OT    Anticipated d/c this evening or tomorrow    24 Hour Events/Subjective  NO acute events o/n. Weaning o2. Says her breathign is nearly back to baseline. Wanting to go home. ROS:   No nausea  No Vomiting  No chest pain      Intake/Output Summary (Last 24 hours) at 4/22/2019 0826  Last data filed at 4/22/2019 0458  Gross per 24 hour   Intake 1080 ml   Output 0 ml   Net 1080 ml         PHYSICAL EXAM:   Blood pressure 97/60, pulse 85, temperature 98.2 °F (36.8 °C), temperature source Oral, resp. rate 16, height 5' 9\" (1.753 m), weight 289 lb 7.4 oz (131.3 kg), SpO2 95 %, not currently breastfeeding.'  Gen:  No acute distress. Eyes: PERRL. Anicteric sclera. No conjunctival injection. ENT: No discharge. Posterior oropharynx clear. External appearance of ears and nose normal.  Neck: Trachea midline. No mass   Resp:  No crackles. b/l wheezes. No rhonchi. No dullness on percussion. CV: Regular rate. Regular rhythm. No murmur or rub. No edema. GI: Soft, Non-tender. Non-distended. +BS  Skin: Warm, dry, w/o erythema. Lymph: No cervical or supraclavicular LAD. M/S: No cyanosis. No clubbing. Neuro:  no focal neurologic deficit. Moves all extremities  Psych: Awake and alert, Oriented x 3. Mood stable.       Medications:    Scheduled Meds:   sodium chloride (Inhalant)  15 mL Nebulization BID    budesonide  0.5 mg Nebulization BID    doxycycline (VIBRAMYCIN) IV  100 mg Intravenous Q12H    busPIRone  10 mg Oral TID    guaiFENesin  600 mg Oral BID    cetirizine  5 mg Oral Daily    lisinopril  10 mg Oral Daily    spironolactone  25 mg Oral Daily    ARIPiprazole  15 mg Oral Daily    vitamin C  500 mg Oral BID    aspirin  81 mg Oral Daily    divalproex  500 mg Oral 3 times per day    escitalopram  10 mg Oral Daily    ferrous sulfate  324 mg Oral Daily with breakfast    metoprolol succinate  12.5 mg Oral Daily    QUEtiapine  200 mg Oral Nightly    torsemide  10 mg Oral Daily    sodium chloride flush  10 mL Intravenous 2 times per day    enoxaparin  40 mg Subcutaneous Daily    famotidine  20 mg Oral BID    nicotine  1 patch Transdermal Daily    ipratropium-albuterol  1 ampule Inhalation Q4H WA    predniSONE  40 mg Oral Daily       Continuous Infusions:      PRN Meds:  perflutren lipid microspheres, acetaminophen, albuterol sulfate HFA, clonazePAM, sodium chloride flush, magnesium hydroxide, ondansetron    Labs reviewed:  CBC:   Recent Labs     04/21/19  0436   WBC 6.7   HGB 13.6   HCT 42.5   MCV 81.9        BMP:   Recent Labs     04/20/19  1042 04/21/19  0436    142   K 3.7 4.0   CL 95* 96*   CO2 36* 34*   PHOS 2.3* 2.8   BUN 17 18   CREATININE 0.5* 0.5*     LIVER PROFILE: No results for input(s): AST, ALT, LIPASE, BILIDIR, BILITOT, ALKPHOS in the last 72 hours. Invalid input(s): AMYLASE,  ALB  PT/INR: No results for input(s): PROTIME, INR in the last 72 hours. APTT: No results for input(s): APTT in the last 72 hours. UA:No results for input(s): NITRITE, COLORU, PHUR, LABCAST, WBCUA, RBCUA, MUCUS, TRICHOMONAS, YEAST, BACTERIA, CLARITYU, SPECGRAV, LEUKOCYTESUR, UROBILINOGEN, BILIRUBINUR, BLOODU, GLUCOSEU, AMORPHOUS in the last 72 hours. Invalid input(s): Tracy Govea  No results for input(s): PH, PCO2, PO2 in the last 72 hours. Films:  Radiology Review:  Pertinent images / reports were reviewed as a part of this visit.     CT Chest w/ contrast: No results found for this or any previous visit. CT Chest w/o contrast:   Results for orders placed during the hospital encounter of 08/10/18   CT Chest WO Contrast    Narrative EXAMINATION:  CT OF THE CHEST WITHOUT CONTRAST 8/10/2018 1:11 pm    TECHNIQUE:  CT of the chest was performed without the administration of intravenous  contrast. Multiplanar reformatted images are provided for review. Dose  modulation, iterative reconstruction, and/or weight based adjustment of the  mA/kV was utilized to reduce the radiation dose to as low as reasonably  achievable. COMPARISON:  None. HISTORY:  ORDERING PHYSICIAN PROVIDED HISTORY: COPD, severe ([address]  TECHNOLOGIST PROVIDED HISTORY:  Technologist Provided Reason for Exam: tracheal stenosis, tobacco abuse, COPD  Acuity: Acute  Type of Encounter: Initial    FINDINGS:  Mediastinum: Thyroid gland appears normal.  Scattered small subcentimeter  mediastinal and hilar nodes are seen. Coronary artery calcification is seen. Trace pericardial fluid seen. Small hiatal hernia is seen. There is  nonspecific thickening at the GE junction. Main pulmonary artery is mildly  enlarged, measuring 4.6 cm    Lungs/pleura: There is mild apical predominant emphysema. Respiratory motion artifact limits evaluation of fine pulmonary parenchymal  change. Bandlike opacity seen posteriorly in the right upper lobe and right lower  lobe. There is mild thickening of the major fissure on the left. Bandlike  opacities seen in left upper and left lower lobe. .  No focal nodule noted,  with the reservation that motion artifact limits the study    Upper Abdomen: Adrenal glands are not imaged on this study. Soft Tissues/Bones: Spurring is seen in the spine      Impression Mild emphysema. There is enlargement of the pulmonary arteries, suggesting  superimposed pulmonary arterial hypertension. Mild thickening of the major fissure on the left.   Otherwise, no pneumonia or  edema    Small hiatal hernia with thickening at the GE junction         CTPA: No results found for this or any previous visit. CXR PA/LAT:   Results for orders placed during the hospital encounter of 04/16/19   XR CHEST STANDARD (2 VW)    Narrative EXAMINATION:  TWO VIEWS OF THE CHEST    4/20/2019 2:57 pm    COMPARISON:  04/18/2019. HISTORY:  ORDERING SYSTEM PROVIDED HISTORY: follow up on fulmonary edema and CHF  TECHNOLOGIST PROVIDED HISTORY:  Reason for exam:->follow up on fulmonary edema and CHF  Ordering Physician Provided Reason for Exam: follow up on fulmonary edema and  CHF  Acuity: Acute  Type of Exam: Subsequent/Follow-up    FINDINGS:  The heart is mildly enlarged. The pulmonary vascular congestion appears to  have resolved. Moderate increased opacity is present in the right lung base  either due to atelectasis or pneumonia. Prominence of the left hilum again  noted. No sizable pleural effusion. Very small pleural effusions are  probably present. Impression Resolution of pulmonary vascular congestion. Moderate right basilar opacity  probably atelectasis although pneumonia possible. CXR portable:   Results for orders placed during the hospital encounter of 01/29/17   XR Chest Portable    Narrative EXAMINATION:  SINGLE VIEW OF THE CHEST    2/1/2017 4:24 am    COMPARISON:  Chest x-ray January 31, 2017    HISTORY:  ORDERING SYSTEM PROVIDED HISTORY: check central line  TECHNOLOGIST PROVIDED HISTORY:  Ordering Physician Provided Reason for Exam: check central line  Acuity: Acute  Type of Exam: Subsequent/Follow-up  Relevant Medical/Surgical History: hx chf, copd,    FINDINGS:  Right internal jugular central venous catheter has been retracted, choroidal,  tip overlying the right-sided neck. Unchanged scattered bilateral airspace disease. Cardiac and mediastinal  shadows unchanged.   Interval removal of endotracheal and nasogastric tubes      Impression Retraction of the central line, the tip is either just barely in the internal  jugular vein, or in the soft tissues next to the vein            Thank you for this consult,    Jun Tavares [address] [place] [address] Pulmonary, Critical Care, and Sleep Medicine

## 2019-04-22 NOTE — PLAN OF CARE
Problem: Pain:  Goal: Pain level will decrease  Description  Pain level will decrease  Outcome: Ongoing  Goal: Control of acute pain  Description  Control of acute pain  Outcome: Ongoing  Goal: Control of chronic pain  Description  Control of chronic pain  Outcome: Ongoing     Problem: Falls - Risk of:  Goal: Will remain free from falls  Description  Will remain free from falls  Outcome: Ongoing  Goal: Absence of physical injury  Description  Absence of physical injury  Outcome: Ongoing     Problem: Breathing Pattern - Ineffective:  Goal: Ability to achieve and maintain a regular respiratory rate will improve  Description  Ability to achieve and maintain a regular respiratory rate will improve  Outcome: Ongoing     Problem: OXYGENATION/RESPIRATORY FUNCTION  Goal: Patient will maintain patent airway  Outcome: Ongoing  Goal: Patient will achieve/maintain normal respiratory rate/effort  Description  Respiratory rate and effort will be within normal limits for the patient  Outcome: Ongoing     Problem: HEMODYNAMIC STATUS  Goal: Patient has stable vital signs and fluid balance  Outcome: Ongoing     Problem: FLUID AND ELECTROLYTE IMBALANCE  Goal: Fluid and electrolyte balance are achieved/maintained  Outcome: Ongoing     Problem: ACTIVITY INTOLERANCE/IMPAIRED MOBILITY  Goal: Mobility/activity is maintained at optimum level for patient  Outcome: Ongoing

## 2019-04-23 ENCOUNTER — HOSPITAL ENCOUNTER (OUTPATIENT)
Dept: CARDIAC REHAB | Age: 59
Setting detail: THERAPIES SERIES
Discharge: HOME | End: 2019-04-23
Payer: COMMERCIAL

## 2019-04-23 NOTE — PROGRESS NOTES
[facility] Facility-Based Therapy for COPD  INDIVIDUAL TREATMENT PLAN (ITP)     NAME: John Ellis  YOB: 1960  Account Number: [de-identified]  AGE: 59 y.o. Diagnosis: Severe COPD which is GOLD Stage 3. PFT: Post Bronch FEV1/FVC: 85%, FEV1: 33% FVC: 37%  Notes: Medicare requirements for Pulmonary Rehabilitation include a PFT within the last 12 months revealing: FEV1/FVC <70, and FEV1 <80%, and documentation from the referring physician stating that the patient has quit smoking or will participate in smoking cessation activities prior to, or during the Pulmonary Rehab program.  A 6 Minute Walk Test (6 MWT) at the beginning and end of the program is also indicated.   GLOSSARY: PF= Physical Fitness  TM=Treadmill  AD= [employer] Air[employer] Bike  UBE=Upperbody Ergometry LBE=Lowerbody Ergometer  NS=NuStep SF= SciFit  ST=Step Training  RT=Resistance Training   PLB=Pursed Lip Breathing   DY= Dynabands  HW= Handweights   Cybex RT= Cybex Mult istation weight machine   RB=Recumbent    REFERRING PHYSICIAN: Dr. Kimberli Braga History:    Last hospital admit for Pulmonary issue:     Past Medical History:   Diagnosis Date    Acute kidney failure (HCC)     Arthritis     Asthma     Congestive heart failure (HCC)     COPD (chronic obstructive pulmonary disease) (HCC)     Dependence on supplemental oxygen     GERD (gastroesophageal reflux disease)     Heart disease     Hyperlipidemia     Hypertension     Muscle weakness     Obesity     Schizophrenia ([facility] [address]     Type 2 diabetes mellitus without complication (HCC)        Surgical History:     Past Surgical History:   Procedure Laterality Date    ANKLE SURGERY      error    HERNIA REPAIR      HYSTERECTOMY         Major Symptoms:     Cough/sputum Yes- white sputum      Wheezing  Yes     Chest Discomfort  No     Orthopnea  Yes- 3-4 pillows     Sleep Disturbances Yes wakes 3 x nightly to urinate     Edema   No     Dyspnea  Yes- exertional    Oxygen Therapy:     Home O2   3-4 lpm  []  Prn  [x] continuous  []  HS     [x] CPAP- does not tolerate []  BiPAP     Company:   [employer]        Comments:    Occupational/ Recreational Activities:    Employment Status:  []  FT  []  PT  [x] Disabled [] Retired    Comments:       Occupation:          Hobbies/Leisure activities: TV, reading, computer    Social/Spiritual:        Social History     Socioeconomic History    Marital status: Single     Spouse name: Not on file    Number of children: Not on file    Years of education: Not on file    Highest education level: Not on file   Occupational History    Not on file   Social Needs    Financial resource strain: Not on file    Food insecurity:     Worry: Not on file     Inability: Not on file    Transportation needs:     Medical: Not on file     Non-medical: Not on file   Tobacco Use    Smoking status: Heavy Tobacco Smoker     Packs/day: 1.00     Years: 40.00     Pack years: 40.00    Smokeless tobacco: Never Used    Tobacco comment: 6 cig QD. Substance and Sexual Activity    Alcohol use:  Yes    Drug use: No    Sexual activity: Yes   Lifestyle    Physical activity:     Days per week: Not on file     Minutes per session: Not on file    Stress: Not on file   Relationships    Social connections:     Talks on phone: Not on file     Gets together: Not on file     Attends Tenriism service: Not on file     Active member of club or organization: Not on file     Attends meetings of clubs or organizations: Not on file     Relationship status: Not on file    Intimate partner violence:     Fear of current or ex partner: Not on file     Emotionally abused: Not on file     Physically abused: Not on file     Forced sexual activity: Not on file   Other Topics Concern    Not on file   Social History Narrative    Not on file          Do you live alone: [x]  Alone []  with spouse/family       Do you have stairs in your home  [x]  no []  yes        Comment:       Transportation  []  drive self []  Family/friend  [x]  Transportation service. Comment:       Cultural/Spiritual Influences: (Taoism groups, etc)       Any Cultural or Amish practices we should be aware of? Fall Risk Assessment:     []  Cognitive Impairment [x]  Balance-\"ccasionally may balance is bad\"   []  Fall History   []  Visual Difficulty   [x]  Dizziness-occasional   []  Other Comments:    Physical Assessment:    Color: [x]  natural []  pale [] cyanotic []  flushed []  jaundice    Skin Integrity [x]  intact [] other:    Heart Rate 110  Resp Rate 20      Breath Sounds: Diminished throughout    Blood Pressures Sitting: Rt arm 103/67  Left arm: 98/64       Standing Rt arm   Left arm: 100/70    Resting SpO2: 96% 3L  Resp effort/pattern: Easy/regular at rest      Peripheral pulses: Yes    Edema:  No    Numbness/tingling:  occasional    Orthopedic/exercise limitations:  No      Psychosocial assessment:    Support System: Family- Mother, daughter, brother    Physical/Behavioral signs of abuse/neglect:    Advance Directives:  No  [] info given-declined    Learning Preferences: Primary Language:English        Preferred method of learning []  video []  handouts        [] listening/lecture   [x]  all    Memory impairment?   No     EXERCISE  Initial Assessment  Day 1 EXERCISE  Intervention  Day 2-30 EXERCISE  Re-Assessment  Day 31-60 EXERCISE  Re-Assessment  Day 61-90+ EXERCISE  Last Day   Date:   12/27/18 Date: 1/24/19 Date: 2/26/19- SEE NOTE BELOW Date: 3/26/19- See Note Below Date:           Pre Rehab  6 MWT  551 ft = 45% pred (reduced)  1.7 METs  SpO2: 94% 4L    1.0  MPH   HR: 117bpm      RPD: 7, RPE: 9                                           Post Rehab   6 MWT  ft = % pred   METs  SpO2: %  MPH  HR:  bpm      RPD:  , RPE:                                        GOALS  List at least 2 patient specific goals    [x]Improve activity for tolerance for routine tasks and walking    [x]Learn proper breathing techniques including PLB    [x]Learn proper pacing with activities    []Learn proper use of oxygen, systems and safety    []Learn proper exercise guidelines    []Learn proper nutrition for my diagnosis    [x] Would like to be able to get around easier                              Learning Barrier(s)  [] Speech           [] Literacy              [] Hearing          [] Cognitive            [] Vision             [x]  Ready to Learn          Balance Issues  [x] Y  [] N  Occasionally loses balance for no reason. Orthopedic Issues  []  Y[x]  N        Rate your Physical   Fitness (PF)?    3/10    Handgrip:  Right:  Left:    EDUCATION  [x]  Staff Introduction  [x]  Proper setup/O2 use  [x]  Equipment Surveyor'n  [x]  RPD/RPE Explain  [x]  SpO2/HR Explain  [x]  Breathing Retraining  [x]  Explain Intensity  [x] Initial Levels set GOALS    1. Not yet    2. Yes    3. Yes    4. Yes                                    If Patient has a Learning Barrier, action:                   If pt has balance or orthopedic issues:  Interventions:                    Rate your physical   fitness (PF)?:   3/10        -30 day PF goal:  4/10    EDUCATION  [x] Understands proper set/up O2 use  [x]  Understands SpO2 and RPD? [x] Aerobic progression explained? [x]  Intensity progression explained?           GOALS                                                                                           Rate your physical   fitness (PF)?:   /10        -30 day PF goal:  /10    EDUCATION   []  Home Activity education offered  [] Stretching/ Flexibility education offered  [] Attended Benefits of Exercise Class  []  Attended Resistance Training Class                                                    GOALS                                                                                           Rate your physical   fitness (PF)?:   /10    -30 day PF goal:  /10    EDUCATION  [] Attended all individually relevant exercise education sessions? []  Knows current exercise goals and recmndtns?:                                                  Goals Achieved? Rate your physical fitness now?:     /10  Handgrip:  Right:  Left:    Discharge  EDUCATION  []  Attended all individually relevant exercise education sessions?    [] Knows current exercise goals and recmndtns?:                                                                                        PHYSICIAN DIRECTED   EXERCISE RX     RPE : 11 - 14  Titrate O2 to keep SpO2 >89%    Frequency (F): 2-3x/wk in Rehab,         Initial Intensity : 1.7 METs (from 6MWT)   1-1.4 ( 60-80% of walk speed for TM)    Type (T): Aerobic (TM,NS, SF, AE, AB, RB, EL, Arc), RT 1-3#, 8-16 reps    CAN USE ALL EQUIPMENT EXCEPT       Time (Ti): Aerobic:   15-40 min               Progression: 1-2 min total time for TM, AB, NS, SF or Arm ergometer per session or when a steady state has occurred in RPE if  SpO2 and HR levels are acceptable           PHYSICIAN DIRECTED   EXERCISE RX       Titrate O2 to keep SpO2 >89%    Frequency (F): 2-3x/wk in Rehab, 1-3x/wk home walking       Intensity (I):  Initial + 5-10% increase each week or when a steady state has occurred in RPE if  SpO2 and HR levels are acceptable  Type (T)  Aerobic (TM,NS, SFR,SFS, AE, AB, RB), RT   8-16 reps    CAN USE ALL EQUIPMENT EXCEPT TM/Elliptical        Time (Ti): Aerobic:   30-40 min        Progression:   1-2 min total time for TM, AB, NS, SF or Arm ergometer per session or when a steady state has occurred in RPE if  SpO2 and HR levels are acceptable        Is pt. progressing in rehab?:  Yes-slowly             PHYSICIAN DIRECTED   EXERCISE RX       Titrate O2 to keep SpO2 >89%    Frequency (F): 2-3x/wk in Rehab, 1-3x/wk home walking       Intensity (I):  Initial + 5-10% increase each week when a steady state has occurred in RPE if  SpO2 and HR levels are acceptable  Type (T)  Aerobic (TM,NS, SFR,SFS, AE, AB, RB), RT   8-16 reps    CAN USE ALL EQUIPMENT EXCEPT        Time (Ti): Aerobic:   30-50 min     Progression:   1-2 min total time for TM, AB, NS, SF or Arm ergometer per session or when a steady state has occurred in RPE if  SpO2 and HR levels are acceptable              Is pt. progressing in rehab?:                 PHYSICIAN DIRECTED   EXERCISE RX       Titrate O2 to keep SpO2 >89%    Frequency (F): 2-3x/wk in Rehab, 1-3x/wk home walking       Intensity (I):  Initial + 5-10% increase each week when a steady state has occurred in RPE if  SpO2 and HR levels are acceptable  Type (T):   Aerobic (TM,NS, SFR,SFS, AE, AB, RB), RT   8-16 reps    CAN USE ALL EQUIPMENT EXCEPT          Time (Ti): Aerobic:   30-58 min         Progression:   1-2 min total time for TM, AB, NS, SF or Arm ergometer per session or when a steady state has occurred in RPE if  SpO2 and HR levels are acceptable            Is pt. progressing in rehab?:               Final Intensity (I): See below and final 6MWT above            ACHIEVED TOTAL AEROBIC EXERCISE TIME:  min         FINAL LEVELS:  [] TM: min  [] Nustep: level  min  [] Arm ergometer: mcpherson min  [] Scifit: level min  [] HW :  # x  reps  [] Dy:    X   reps  [] Cybex RT:    # x   Reps  [] Eliptical:level  X  Min  [] Arc: level   X    mins  [] RB:  Level  X   mins  [] AB: level   X     Min              Did pt. progress in rehab?:     30 DAY TARGET GOAL  [x] Start slowly but progress each week  [x] Increase duration on the equipment  [x] Start resistance training    -30 day PF goal: 4/10                 Current Home Exercise :none    Frequency:      Type:                     Health Knowledge   Test Score:  19/25     Current Home Exercise:   Frequency: Every other day     Type: Warmups, chair stands, and P.T.  Exercises         Time: 20-30  min                                  Current Home Exercise: infection/exacerbations             Yvonne's INDIVIDUAL TREATMENT PLAN  OXYGEN AND MEDICATIONS    OXYGEN  MEDICATIONS   Initial Assessment  Day 1 OXYGEN  MEDICATIONS  Intervention  Day 2-30 OXYGEN  MEDICATION  Re-Assessment  Day 31-60 OXYGEN  MEDICATION  ReAssessment Day 61-90+ OXYGEN  MEDICATION  Discharge  Final Day                    Medications  [x]  Uses as prescribed  []  Non- compliant with prescribed meds    Respiratory Medications    []  Proper use   and technique of MDI, DPI and/or nebs  []  Incorrect use and technique of MDI, DPI and /or nebs    Hypoxemia  Problem:    [x]  No problem  [] Noncompliant with O2 use  []  Oxygen in [place] only  []  No home O2  []  No portable O2  []  Needs O2 prescription and O2 qualifying data sent for setup   ()Poor knowledge of O2 use/safety    Current Oxygen Prescription:   3-4 l/min rest  3-4 l/min exercise   titration   plan/weaning plan:  CPAP/BIPAP:    Goals:     []  Manage Hypoxemia  []  receive adequate portable system  []  Compliant with use as prescribed  []  Learn O2 safety guidelines   Medications  [x]  Uses as prescribed  []  Non- compliant with prescribed meds    Respiratory Medications    [] Proper use and technique of MDI, DPI and/or nebs  []  Incorrect use and technique of MDI, DPI and /or nebs    Hypoxemia  Problem:      Current Oxygen Prescription:   3 l/min rest  3 l/min exercise   titration plan/weaning plan:3lpm at bedtime    Goals:   []  Manage Hypoxemia  []  receive adequate portable system  []  Compliant with use as prescribed  []  Learn O2 safety guidelines           Medications  []  Uses as prescribed  [] Non- compliant with prescribed meds    Respiratory Medications    []  Proper use and technique of MDI, DPI and/or nebs  [] Incorrect use and technique of MDI, DPI and /or nebs    Hypoxemia  Problem:      Current Oxygen Prescription:    l/min rest   l/min exercise   titration plan/weaning plan:    Goals:   []  Manage Hypoxemia  []  receive adequate []Gas-producing foods   [] Wt gain / loss strategies     GOALS    Weight Loss         30 DAY TARGET GOAL:    [x] Decrease portion size by 250-500 calories/day  [x] Increase fluid intake to 6-8 8oz. [x] Eat less sweets      Reasonable, achievable 30 day weight goal:298 lbs   (1-2 lb per week is recommended)            Weight Gain        30 day   Target Goal:    [] increase proteins  [] add nutrition  Supplement  [] 6 small meals      Did pt meet Initial 30 day Target Goal(s)?:     New 30 DAY TARGET GOAL:    [] Decrease saturated fats  [] Decrease gas producing  cruciferous veggies   -  Reasonable, achievable 30 day weight goal:  301lbs    States she eats wrong food. Scheduled for nutrition class next Tues. Did pt meet previous 30 day Target   Goal(s)?:     New 30 DAY TARGET GOAL:    [] Switch to whole grains whenever possible  [] Decrease/ Eliminate carbonated beverages    Reasonable, achievable 30 day weight goal:    Did pt meet previous 30 day Target   Goal(s)?:      New 30 DAY TARGET GOAL:    [] Smaller meals more frequently  [] Decrease portion sizes by 25%      Reasonable, achievable 30 day weight goal:                  Did pt meet previous 30 day Target   Goal(s)?:                         Yvonne's INDIVIDUAL TREATMENT PLAN  PSYCHOSOCIAL DOMAIN:    Psychosocial   Initial Assessment  Day 1 Psychosocial   Intervention  Day 2-30 Psychosocial  Re-Assessment  Day 31-60 Psychosocial   Re-Assessment  Day 61-90+ Psychosocial Discharge  Final Day   Psychosocial Screening Tools    (X) PHQ-9 score: 12      (X) SF-36: 36       (X) UCSD SOB score: 70         PHQ-9 Score: If score >or =15, Action:     SF-36 Mental Score:     UCSD Score: Any action on Screening Tools?:                  Psychosocial Screening   Tools    Repeat PHQ-9 Score if lidya:    Repeat SF-36      Repeat UCSD SOB Score:       Assessment  []  S/S of depression identified?     []  PCP notified of PHQ-9 results      []  On Anti-anxiety / anti-depression meds?:            Intervention  If S/S of depression present, action taken:     Is the patient receiving support for the DC program at home?:  [x]  Y    [] N    Is the patient tolerating the intensity and duration of the DC program?:     [x] Y   [] N Re-Assessment  [] S/S of depression present? If [x] , action:         Med Changes?:   [] Y   [] N        Is the patient tolerating the intensity and duration of the DC program?:    []  Y    [] N  Re-Assessment  Psych Issue notes:        Is the patient receiving support for the DC program at home?:  []  Y  [] N    Is the patient tolerating the intensity and duration of the DC program?:  []  Y   []  N     Final Assessment                       Results of any Physician/ Counselor Intervention?:     [] Y   [] N         GOALS        30 DAY TARGET GOAL    []  Assess Mental Score using   SF-36 and PHQ-9   []  Teach PLB  []  Reassure pt that (s)he can stop and rest, adjust the speeds, and/or switch modalities from our suggestions  -    (0 being 'None', 10 being 'Very'),  -How would you rate your Anxiety Level: 8      -How would you rate your level of  depression: 8    -How would you rate your mood: (0 is negative all the time, 10 is positive all the time): 8          ADL's  Assess PF score on SF-36    Score= 60    From [facility], ADL pt struggles with most: Grocery shopping     Out of 10, rate your ability to do that ADL now.   3     GOALS        Did pt meet Initial 30 day   Target Goal(s)?:       30 DAY NEW TARGET GOAL    [x] Decrease/Maintain anxiety and depression level  [x] Increase ability to complete ADL  [] Encourage pt to rate exercises truthfully to encourage correct intensity / duration prescription  -  (0 being 'None', 10 being 'Very'),  -How would you rate your Anxiety Level: 8/10- on medication      -How would you rate your level of depression: 8/10- on medication      -How would you rate your mood:4-5/10      ADLs: 5/10- Has an aide 2x/week to help with stretches  Attend Classes:  [] Nutrition  []  Panic conntrol, relaxation, managing depression  []  A&P of the Respiratory System   [] Environ. Issues+ Travel   [] Bronchial Hygiene / Preventing Infection  []  Resistance Training  [x] Benefits of Exercise  [] Energy Cons    Education  Individual instruction  [] PLB  [] RPD/RPE   []  O2 use  []  review PFT  [] Inhalers   [] Home Activity/Warm up/ stretches  Attend Classes:  [] Nutrition  []  Panic conntrol, relaxation, managing depression  [] A&P of the Respiratory System   [] Environ. Issues+ Travel   [] Bronchial Hygiene / Preventing Infection  []  Resistance Training  [x] Benefits of Exercise  [] Energy Cons   Education  []  Addressed pt questions on Education Topics                                                         Physician Interaction / Response:  (If none, continue on present care plan)     Physician Interaction / Response:   (If none, continue on present care plan)   Physician Interaction / Response:   (If none, continue on present care plan)   Physician Interaction / Response:   (If none, continue on present care plan)   Physician Interaction / Response:       Discharge the patient. Rehab Potential:  []  Good [] Fair [x] Poor    Summary: Shashank Brown is a 59 yr old female with a History of COPD who was referred to Pulmonary Rehab for shortness of breath and deconditioning. She presents today for her initial evaluation. She has been on oxygen @ 3L since Nov 2017 after a hospitalization for respiratory failure in which she was on the vent and had a tracheostomy placed for failure to wean. She spent some time in a rehab facility and was decannulated in January 2018. Currently she lives alone and continues to smoke despite her sob and oxygen requirements. She uses a rolling walker and reports that she is unable to get around much at all due to her shortness of breath and fatigue. She will attend 28 sessions of IN twice weekly.     Shaista Giles will exercise up to 45 min starting at low levels. Time and intensity to be increased based on RPE. Oxygen: She will exercise on 4L of oxygen as determined by 6 min walk test to maintain SaO2> 89%. Medications affecting HR: Albuterol HHN q 4hrs prn, Albuterol MDI 2 puffs q 4hrs prn, Metoprolol succ 12.5mg daily. Nutrition: Wt. 302#. Sherrie Pavon states her wt has gone up 20-30# over the past year. She admits to frequently snacking on chips and dip, and drinks koolaid. Basic healthy eating tips reviewed with her including substituting fruits and veggies for chips, cutting back on portion sized, and drinking water instead of koolaid. She will attend General Nutrition class during MI. Psychosocial including Dyspnea with ADL's, Depression/Anxiety and Social Functioning:Yvonne lives alone. She has one daughter who has a vision impairment and is unable to assist her much. She does not know much about her medications. She has a visiting home health aid who comes weekly to set up her medications and check her VS. She also gets assistance cleaning her apartment. She relies on transportation services through her insurance to get her to and from MD appts, grocery store, etc. Sherrie Pavon states she has seen a Psychiatrist for years for anxiety and depression, both of which she rates 8/10, as well as for treatment of her bipolar disorder and schizophrenia. She reports that he recently made some changes to her medications,(although she is unable to tell me which ones) and feels like it has helped. Balaji ONEIL      Other:    30 Day Report: 1/24/19:    Summary: Sherrie Pavon has attended 6 exercise sessions and 1 education class. She missed 2 weeks due to cataract surgeries. Exercise: She exercises for 35 minutes at low levels. She is also working on [employer], chair stands, and P.T. exercises at home on her \"off rehab\" days. Oxygen: O2 is set at 4lpm with exercise. Saturations are above 90%. Medications: Takes as prescribed.    Nutrition: Tppxul=870.5lbs. States she eats the wrong foods. Scheduled to attend nutrition class in 2 weeks. Psychosocial: Reports continued high anxiety and depression but is on medication for both and sees a psychiatrist.  Other: Chago Doyle uses a rolling walker to move about the room and has reported sore muscles. MAKAYLA Lauren. Ed.     60 Day Report: 2/26/19:    Summary: Chago Doyle has attended 11 exercise sessions and 2 education classes. She has missed several sessions recently due to having cataract surgery and problems with her elevator lift at home. Exercise: She exercises for 40 minutes at low levels. Oxygen: O2 is required at 4lpm to maintain adequate saturations. Sats above 90% while exercising on seated equipment. Cannot walk on TM. Medications: Not present today to discuss. Nutrition: Last weight=307lbs on 2//21/19. Psychosocial: Unable to ask as pt not present. Other: Pt continue to smoke 7 cigarettes/day.   Doc Fleming. Ed.    90 Day Report: 3/26/19:   Summary: Chago Doyle is not present today due to lack of transportation. She has completed 14 exercise sessions and 2 education classes. Exercise: She completes non-weight bearing exercises for 40 minutes. Oxygen: O2 is required at 4lpm. SaO2 levels above 90%. Medications: Unable to discuss. Nutrition: Last yrwxci=172.4lbs  Psychosocial: Unable to discuss.  LUIS MelendezEd.    120 Day Report 4/26/19: (note only)  Chago Doyle has completed just 19 exercise sessions since starting the program on 12/27/18. She was hospitalized from 4/16/19 for hypoxia & acute respiratory failure due to COPD exac and bronchitis. She was just discharged yesterday (4/17/19)  is currently receiving home health services, therefore her CT will be placed on hold. She was informed of this and instructed to notify staff when she is no longer receiving [facility] and has been cleared to return.SHANITA Mcdonough RN                         Referring Physician:  Dr. Raffy Small

## 2019-04-24 ENCOUNTER — SCHEDULED TELEPHONE ENCOUNTER (OUTPATIENT)
Dept: PHARMACY | Age: 59
End: 2019-04-24

## 2019-04-25 ENCOUNTER — APPOINTMENT (OUTPATIENT)
Dept: CARDIAC REHAB | Age: 59
End: 2019-04-25
Payer: COMMERCIAL

## 2019-04-26 ENCOUNTER — OFFICE VISIT (OUTPATIENT)
Dept: CARDIOLOGY CLINIC | Age: 59
End: 2019-04-26
Payer: COMMERCIAL

## 2019-04-26 VITALS
SYSTOLIC BLOOD PRESSURE: 124 MMHG | DIASTOLIC BLOOD PRESSURE: 76 MMHG | HEIGHT: 69 IN | BODY MASS INDEX: 43.4 KG/M2 | OXYGEN SATURATION: 94 % | HEART RATE: 90 BPM | WEIGHT: 293 LBS

## 2019-04-26 DIAGNOSIS — G47.33 OSA (OBSTRUCTIVE SLEEP APNEA): ICD-10-CM

## 2019-04-26 DIAGNOSIS — I50.32 CHRONIC DIASTOLIC HEART FAILURE (HCC): Primary | ICD-10-CM

## 2019-04-26 DIAGNOSIS — F17.218 CIGARETTE NICOTINE DEPENDENCE WITH OTHER NICOTINE-INDUCED DISORDER: ICD-10-CM

## 2019-04-26 DIAGNOSIS — I10 ESSENTIAL HYPERTENSION: ICD-10-CM

## 2019-04-26 PROCEDURE — 99214 OFFICE O/P EST MOD 30 MIN: CPT | Performed by: INTERNAL MEDICINE

## 2019-04-26 RX ORDER — NICOTINE 21 MG/24HR
1 PATCH, TRANSDERMAL 24 HOURS TRANSDERMAL DAILY
Qty: 42 PATCH | Refills: 0 | Status: SHIPPED | OUTPATIENT
Start: 2019-04-26 | End: 2019-07-31

## 2019-04-26 NOTE — PATIENT INSTRUCTIONS

## 2019-04-30 ENCOUNTER — OFFICE VISIT (OUTPATIENT)
Dept: PHARMACY | Age: 59
End: 2019-04-30
Payer: COMMERCIAL

## 2019-04-30 DIAGNOSIS — J44.9 COPD, SEVERE (HCC): Primary | ICD-10-CM

## 2019-04-30 PROCEDURE — 99214 OFFICE O/P EST MOD 30 MIN: CPT

## 2019-04-30 RX ORDER — DIVALPROEX SODIUM 250 MG/1
250-500 TABLET, EXTENDED RELEASE ORAL DAILY
COMMUNITY

## 2019-04-30 RX ORDER — TORSEMIDE 10 MG/1
20 TABLET ORAL 2 TIMES DAILY
COMMUNITY
End: 2019-07-15 | Stop reason: SDUPTHER

## 2019-04-30 RX ORDER — MULTIVITAMIN WITH FOLIC ACID 400 MCG
1 TABLET ORAL DAILY
COMMUNITY

## 2019-04-30 NOTE — PROGRESS NOTES
[facility]  Pharmacist  COPD Service    [address], [address]  Phone: [phone]  Fax: [phone]      NAME: Jacquie Armenta  MEDICAL RECORD NUMBER:  [id number]  AGE: 59 y.o. GENDER: female  : 1960  EPISODE DATE:  2019      [facility] by Dr. Gianfranco Christensen, CNP     Subjective   Ms. Yady Castillo is a 59 y.o. female here for the COPD Services. They are here today for a comprehensive medication review including over the counter medications and herbal products, overall wellbeing assessment, transition of care and any needed adjustments with updates and recommendations communicated to the referring physician. Patient Findings: no acute findings    Comments:  Recent hospitalization -19. Spent this visit (45 min) reviewing medications only.      Objective     Patient Active Problem List   Diagnosis Code    Blurred vision H53.8    Cataract of both eyes H26.9    Progressive high myopia H44.20    Acute CHF (congestive heart failure) (HCC) I50.9    Encephalopathy acute G93.40    CO2 narcosis R06.89    Respiratory acidosis E87.2    Hypotension I95.9    Acute respiratory failure with hypoxia and hypercapnia (HCC) J96.01, J96.02    Obesity, morbid ([facility]) E66.01    Tobacco abuse Z72.0    COPD exacerbation (HCC) J44.1    Schizoaffective disorder ([facility] [address] F25.9    ARF (acute renal failure) ([facility]) N17.9    Microcytosis R71.8    PNA (pneumonia) J18.9    Abnormal ECG R94.31    RICHARD (acute kidney injury) ([facility] [address] N17.9    Acute on chronic respiratory failure with hypoxia and hypercapnia (HCC) J96.21, J96.22    Acute pulmonary edema (HCC) J81.0    Acute on chronic diastolic heart failure (HCC) I50.33    COPD, severe (HCC) J44.9    Viral bronchitis J20.8    Hypoxia R09.02     Social History     Tobacco Use    Smoking status: Heavy Tobacco Smoker     Packs/day: 1.00     Years: 40.00     Pack years: 40.00    Smokeless tobacco: Never Used    Tobacco comment: 6 cig QD. Substance Use Topics    Alcohol use: Yes    Drug use: No         BMP:   Lab Results   Component Value Date     04/21/2019    K 4.0 04/21/2019    K 5.0 04/17/2019    CL 96 04/21/2019    CO2 34 04/21/2019    BUN 18 04/21/2019    CREATININE 0.5 04/21/2019     CBC:    Lab Results   Component Value Date    WBC 6.7 04/21/2019    HGB 13.6 04/21/2019    HCT 42.5 04/21/2019     04/21/2019       Assessment    There were no vitals taken for this visit. BP Readings from Last 3 Encounters:   04/26/19 124/76   04/22/19 121/78   01/21/19 120/60       Wt Readings from Last 3 Encounters:   04/26/19 297 lb (134.7 kg)   04/22/19 289 lb 7.4 oz (131.3 kg)   04/09/19 (!) 300 lb 14.4 oz (136.5 kg)       Medications reviewed by the Pharmacist: Yes   [x] compared patient's bottles and inhalers with the EPIC list  [] patient did not bring medication bottles / inhalers    Total Discrepancies: 3  COPD Medication Discrepancies: 1      Medication Discrepancies :   1. Reports taking Advair once per day - advised to take twice daily  2. Did not have Abilify - not taking. She will verify  3. Did not have vitamin C - not taking. She will verify      Medication Reconciliation comments:   She had duplicate bottles of almost every medication, some triple bottles. We sifted through these and combined bottles as appropriate. She had torsemide 10mg tablets take 2 tablets twice daily and then 20mg tablets take 1 tablet twice daily. We used up most of the 10mg tablets and then she agreed to discard the remainder to eliminate confusion. She had 2 bottles of clonazepam 0.5mg tablets. However, in the one bottle, she had 3 different tablets. I clarified one of these to be 0.5mg tablets. The second tablet was clonazepam 2mg. And the 3rd tablet, could not be identified. She had an almost full bottle of the 0.5mg.  She agreed to dispose of the remainder tablets of the wrong strength and the unidentifiable tablets. I took the torsemide and clonazepam to the retail pharmacy and put these in the drop box. She was given an RX for buspirone for 30 days. It has no refills and she felt she was to stop it after the 30 days. I advised to clarify this. She plans to make an appointment with her physician to be able to get refills on her medications. She was instructed by Dr. Candy Spence 4/26/19 to stop aspirin and lisinopril. She had 2 bottles of lisinopril. We combined these and I put in a separate back labled not taking. I also gave her pill holders for morning, noon and night and labelled as such. I then helped her fill these for the week. She only had 3 days of Lexapro. I advised to call her pharmacy for refills and then add to the pill hernandez. She seems to have a good understanding of her medications. She keeps notes in a note book to remind her of what she needs to do. She just needed help to organize. I feel she will be okay going forward. She could benefit from further follow up to assess her medications. Needs refill on Zofran, she will get this.       Current medications:  Outpatient Medications Marked as Taking for the 4/30/19 encounter (Office Visit) with SCHEDULE, [facility] SCHEDULE   Medication Sig Dispense Refill    divalproex (DEPAKOTE ER) 250 MG extended release tablet Take 250 mg by mouth daily And 2 tablets every evening      Multiple Vitamin (MULTIVITAMIN) tablet Take 1 tablet by mouth daily      torsemide (DEMADEX) 10 MG tablet Take 20 mg by mouth 2 times daily      nicotine (NICODERM CQ) 21 MG/24HR Place 1 patch onto the skin daily 42 patch 0    nicotine polacrilex (NICORETTE) 2 MG gum Take 1 each by mouth as needed for Smoking cessation 110 each 1    busPIRone (BUSPAR) 10 MG tablet Take 1 tablet by mouth 3 times daily 30 tablet 0    Umeclidinium Bromide (INCRUSE ELLIPTA) 62.5 MCG/INH AEPB Inhale 1 puff into the lungs daily 1 each 6    albuterol sulfate HFA (PROAIR HFA) 108 (90 Base) MCG/ACT inhaler Inhale 2 puffs into the lungs every 4 hours as needed for Wheezing or Shortness of Breath 1 Inhaler 11    albuterol (PROVENTIL) (2.5 MG/3ML) 0.083% nebulizer solution Take 3 mLs by nebulization every 4 hours as needed for Wheezing 360 vial 11    vitamin D (CHOLECALCIFEROL) 1000 UNIT TABS tablet Take 1,000 Units by mouth daily      clonazePAM (KLONOPIN) 0.5 MG tablet Take 0.5 mg by mouth 2 times daily as needed for Anxiety. Patient takes daily in the morning      tiotropium (SPIRIVA HANDIHALER) 18 MCG inhalation capsule Inhale 1 capsule into the lungs daily 30 capsule 6    fluticasone-salmeterol (ADVAIR DISKUS) 250-50 MCG/DOSE AEPB Inhale 1 puff into the lungs every 12 hours 60 each 6    acetaminophen (TYLENOL) 500 MG tablet Take 500 mg by mouth every 6 hours as needed for Pain      ondansetron (ZOFRAN-ODT) 4 MG disintegrating tablet Take 4 mg by mouth every 8 hours as needed for Nausea or Vomiting      polyethylene glycol (GLYCOLAX) powder Take 17 g by mouth daily      metoprolol succinate (TOPROL XL) 25 MG extended release tablet Take 0.5 tablets by mouth daily 30 tablet 5    ferrous sulfate 325 (65 Fe) MG tablet Take 325 mg by mouth daily (with breakfast)      loratadine (CLARITIN) 10 MG tablet Take 10 mg by mouth daily      escitalopram (LEXAPRO) 10 MG tablet Take 10 mg by mouth daily      spironolactone (ALDACTONE) 25 MG tablet Take 1 tablet by mouth daily 30 tablet 3    QUEtiapine (SEROQUEL) 200 MG tablet Take 200 mg by mouth nightly And 400mg every night      fluticasone (FLONASE) 50 MCG/ACT nasal spray 1 spray by Nasal route daily 1 Bottle 3    ranitidine (ZANTAC) 150 MG capsule Take 150 mg by mouth 2 times daily        Additional assessment:   We did not have time to review COPD medications and general COPD information. Will call to schedule f/u as soon as possible.      She would like to reduce her pill burden, her physician is going to work on this. I can work next visit to see if there are any combination medications she could possibly switch to. She is working to get the nicotine patches and gum paid for by her insurance.  on Aging is helping her. Plan     Will call to schedule f/u and review general COPD information and proper COPD medication use.      Follow-up visit will include:   [x] Comprehensive Medication Review with the Pharmacist  [x] Smoking cessation   [] Other         Electronically signed by Yoselyn Chatman PharmD on 4/30/2019 at 4:18 PM

## 2019-04-30 NOTE — PATIENT INSTRUCTIONS
1. Check with your physician as to whether to continue buspirone  2. Check to see if you need to take Abilify  3. Check to see if you should be taking vitamin C  4. You need a refill on Zofran. 5. You also need a refill on Lexapro and then will have to add this to your pill holders. 6. Take Advair twice daily as instructed.

## 2019-05-09 ENCOUNTER — TELEPHONE (OUTPATIENT)
Dept: CARDIOLOGY CLINIC | Age: 59
End: 2019-05-09

## 2019-05-09 NOTE — TELEPHONE ENCOUNTER
Roselyn An from [address] called in stating that he cannot get torsemide (DEMADEX) 10 MG tablet or 30 MG and he stated he didn't know what Dr. Keisha Tolbert wanted to instead of this medication because he is unable to get this medication. You can reach the pharmacy at [phone].

## 2019-05-09 NOTE — TELEPHONE ENCOUNTER
Called/spoke to Juan José at [facility]. He states that they do have some torsemide and will get the pt's medication processed.

## 2019-05-16 ENCOUNTER — OFFICE VISIT (OUTPATIENT)
Dept: PULMONOLOGY | Age: 59
End: 2019-05-16
Payer: COMMERCIAL

## 2019-05-16 VITALS
BODY MASS INDEX: 43.4 KG/M2 | DIASTOLIC BLOOD PRESSURE: 67 MMHG | HEIGHT: 69 IN | RESPIRATION RATE: 24 BRPM | TEMPERATURE: 98.3 F | OXYGEN SATURATION: 92 % | SYSTOLIC BLOOD PRESSURE: 104 MMHG | WEIGHT: 293 LBS | HEART RATE: 102 BPM

## 2019-05-16 DIAGNOSIS — J39.8 TRACHEAL STENOSIS: ICD-10-CM

## 2019-05-16 DIAGNOSIS — Z72.0 TOBACCO ABUSE: ICD-10-CM

## 2019-05-16 DIAGNOSIS — J96.11 CHRONIC RESPIRATORY FAILURE WITH HYPOXIA AND HYPERCAPNIA (HCC): ICD-10-CM

## 2019-05-16 DIAGNOSIS — J96.12 CHRONIC RESPIRATORY FAILURE WITH HYPOXIA AND HYPERCAPNIA (HCC): ICD-10-CM

## 2019-05-16 DIAGNOSIS — J44.9 COPD, SEVERE (HCC): Primary | ICD-10-CM

## 2019-05-16 PROCEDURE — 99214 OFFICE O/P EST MOD 30 MIN: CPT | Performed by: INTERNAL MEDICINE

## 2019-05-16 RX ORDER — SODIUM CHLORIDE FOR INHALATION 3 %
4 VIAL, NEBULIZER (ML) INHALATION 2 TIMES DAILY
Qty: 240 ML | Refills: 3 | Status: SHIPPED | OUTPATIENT
Start: 2019-05-16 | End: 2019-07-31 | Stop reason: SDUPTHER

## 2019-05-16 NOTE — PATIENT INSTRUCTIONS
Continue with Advair one puff twice a day    Continue with spiriva one puff a day    Stop using Incruse now    Use sodium chloride to help get mucus out    Use albuterol as needed    Continue with oxygen    Resume therapy when you can    I will try to get a machine for sleep    Stop smoking    Follow up in 2 months

## 2019-05-16 NOTE — PROGRESS NOTES
Packs/day: 1.00     Years: 40.00     Pack years: 40.00    Smokeless tobacco: Never Used    Tobacco comment: 6 cig QD. Substance and Sexual Activity    Alcohol use:  Yes    Drug use: No    Sexual activity: Yes   Lifestyle    Physical activity:     Days per week: Not on file     Minutes per session: Not on file    Stress: Not on file   Relationships    Social connections:     Talks on phone: Not on file     Gets together: Not on file     Attends Buddhism service: Not on file     Active member of club or organization: Not on file     Attends meetings of clubs or organizations: Not on file     Relationship status: Not on file    Intimate partner violence:     Fear of current or ex partner: Not on file     Emotionally abused: Not on file     Physically abused: Not on file     Forced sexual activity: Not on file   Other Topics Concern    Not on file   Social History Narrative    Not on file         Current Outpatient Medications:     divalproex (DEPAKOTE ER) 250 MG extended release tablet, Take 250 mg by mouth daily And 2 tablets every evening, Disp: , Rfl:     Multiple Vitamin (MULTIVITAMIN) tablet, Take 1 tablet by mouth daily, Disp: , Rfl:     torsemide (DEMADEX) 10 MG tablet, Take 20 mg by mouth 2 times daily, Disp: , Rfl:     busPIRone (BUSPAR) 10 MG tablet, Take 1 tablet by mouth 3 times daily, Disp: 30 tablet, Rfl: 0    Umeclidinium Bromide (INCRUSE ELLIPTA) 62.5 MCG/INH AEPB, Inhale 1 puff into the lungs daily, Disp: 1 each, Rfl: 6    albuterol sulfate HFA (PROAIR HFA) 108 (90 Base) MCG/ACT inhaler, Inhale 2 puffs into the lungs every 4 hours as needed for Wheezing or Shortness of Breath, Disp: 1 Inhaler, Rfl: 11    albuterol (PROVENTIL) (2.5 MG/3ML) 0.083% nebulizer solution, Take 3 mLs by nebulization every 4 hours as needed for Wheezing, Disp: 360 vial, Rfl: 11    vitamin D (CHOLECALCIFEROL) 1000 UNIT TABS tablet, Take 1,000 Units by mouth daily, Disp: , Rfl:     clonazePAM (KLONOPIN) 0.5 MG tablet, Take 0.5 mg by mouth 2 times daily as needed for Anxiety.  Patient takes daily in the morning, Disp: , Rfl:     tiotropium (Jason Rell) 18 MCG inhalation capsule, Inhale 1 capsule into the lungs daily, Disp: 30 capsule, Rfl: 6    fluticasone-salmeterol (ADVAIR DISKUS) 250-50 MCG/DOSE AEPB, Inhale 1 puff into the lungs every 12 hours, Disp: 60 each, Rfl: 6    acetaminophen (TYLENOL) 500 MG tablet, Take 500 mg by mouth every 6 hours as needed for Pain, Disp: , Rfl:     ondansetron (ZOFRAN-ODT) 4 MG disintegrating tablet, Take 4 mg by mouth every 8 hours as needed for Nausea or Vomiting, Disp: , Rfl:     polyethylene glycol (GLYCOLAX) powder, Take 17 g by mouth daily, Disp: , Rfl:     sodium chloride 1 g tablet, Take 1 g by mouth daily, Disp: , Rfl:     Ascorbic Acid (VITAMIN C) 500 MG tablet, Take 500 mg by mouth 2 times daily, Disp: , Rfl:     metoprolol succinate (TOPROL XL) 25 MG extended release tablet, Take 0.5 tablets by mouth daily, Disp: 30 tablet, Rfl: 5    ferrous sulfate 325 (65 Fe) MG tablet, Take 325 mg by mouth daily (with breakfast), Disp: , Rfl:     loratadine (CLARITIN) 10 MG tablet, Take 10 mg by mouth daily, Disp: , Rfl:     escitalopram (LEXAPRO) 10 MG tablet, Take 10 mg by mouth daily, Disp: , Rfl:     spironolactone (ALDACTONE) 25 MG tablet, Take 1 tablet by mouth daily, Disp: 30 tablet, Rfl: 3    QUEtiapine (SEROQUEL) 200 MG tablet, Take 200 mg by mouth nightly And 400mg every night, Disp: , Rfl:     fluticasone (FLONASE) 50 MCG/ACT nasal spray, 1 spray by Nasal route daily, Disp: 1 Bottle, Rfl: 3    ARIPiprazole (ABILIFY) 5 MG tablet, Take 15 mg by mouth daily , Disp: , Rfl:     ranitidine (ZANTAC) 150 MG capsule, Take 150 mg by mouth 2 times daily, Disp: , Rfl:     nicotine (NICODERM CQ) 21 MG/24HR, Place 1 patch onto the skin daily, Disp: 42 patch, Rfl: 0    nicotine polacrilex (NICORETTE) 2 MG gum, Take 1 each by mouth as needed for Smoking cessation, Disp: 110 each, Rfl: 1      ROS:  GENERAL:  Recent admission for respiratory failure and COPD flare up  HEENT:  Sinus congestion  HEART:  No chest pain, no palpitations  LUNGS: SOB, chest congestion   ABDOMEN:  No abdominal pains, no changes in stools  GENITOURINARY:  No increased frequency, no blood in urine  EXTREMITIES: chronic swelling, no lesions; leg pains  NEURO:  No numbness or tingling, no seizures; back pains  SKIN:  No new rashes, no changes in hair or nails  LYMPH:  No masses, no swelling neck, armpits, or groin    PHYSICAL EXAM:  Vitals:    05/16/19 1415   BP: 104/67   Pulse: 102   Resp: 24   Temp: 98.3 °F (36.8 °C)   SpO2: 92%       GENERAL:  Chronically ill appearing, lethargic   HEENT:  Muffled voice, Mallampati IV  NECK:  Post-trach scar, no adenopathy, no stridor  LYMPH:  No cervical or supraclavicular adenopathy  HEART:  Regular rate and rhythm, no murmurs. Distant heart   LUNGS:  Faint wheezing, no rhonchi. Diminished breath sounds   ABDOMEN:  No distention, no organomegaly  EXTREMITIES:  +  edema, no digital clubbing, stasis changes   NEURO:  No localizing deficits, CN II-XII intact. Has walker     Pulmonary Function Testing 1/2017  Severe Obstruction  Suggestion of Restriction    Chest imaging from 8/2018 is reviewed. My impression is slightly narrowed trachea, emphysema, motion artifact, bands of atelectasis    Chest imaging from 4/2019 is reviewed. My impression is right hilar prominence, fluid in fissure and possible airspace disease    ECHO 4/2019  Left ventricular cavity size is normal.   Normal left ventricular wall thickness.   Left ventricular wall motion is normal with LVEF 55%   The right ventricle has preserved systolic function on 2-D imaging.   Lab Results   Component Value Date    WBC 6.7 04/21/2019    HGB 13.6 04/21/2019    HCT 42.5 04/21/2019    MCV 81.9 04/21/2019     04/21/2019       Lab Results   Component Value Date    .0 (H) 02/04/2015       Lab Results Component Value Date    CREATININE 0.5 (L) 2019    BUN 18 2019     2019    K 4.0 2019    CL 96 (L) 2019    CO2 34 (H) 2019     AB2019  7.31/82/70  7.32/75/65  7.27/84/58    I reviewed the above labs and images     Assessment/Plan:  1. COPD, severe ([address]  Semi-controlled. If she could quit tobacco that would be great. She is on too much LAMA therapy. Needs to stop Incruse. Continue with spiriva, advair and albuterol. Needs to get back into rehab as soon as she can. - sodium chloride, Inhalant, 3 % nebulizer solution; Take 4 mLs by nebulization 2 times daily  Dispense: 240 mL; Refill: 3    2. Chronic respiratory failure with hypoxia and hypercapnia (HCC)  Uses 3 liters at night and has CO2 of 80. Needs to continue with oxygen while I try to obtain a NIV for nighttime use    3. Tracheal stenosis  Previous trach. Was decannulated back in 2018. Seems to not have any issues related to that at this time    4. Tobacco abuse  Smoking about 3 cigs per day. Needs to quit. She is trying but is not quit there. She has made improvement    She suffers from chronic respiratory failure related to COPD. She had marginal benefit with BIPAP while in the hospital.  She would benefit from NIV. Will place orders. Goal Vt would be 500 ml with 4 liters of oxygen bleed in. Pulmonary Rehab:  Enrolled.   Needs to go back soon     Lung Cancer Screening CT:  Up to date as of 2018    Follow up in 2 months

## 2019-05-17 ENCOUNTER — TELEPHONE (OUTPATIENT)
Dept: CARDIOLOGY CLINIC | Age: 59
End: 2019-05-17

## 2019-05-17 ENCOUNTER — TELEPHONE (OUTPATIENT)
Dept: PULMONOLOGY | Age: 59
End: 2019-05-17

## 2019-05-17 NOTE — TELEPHONE ENCOUNTER
Did not see any orders for [employer] scannned into [place] from [facility] called and spoke to Corby de la cruz. Said the order was for a NIV.  Candis said that Aldo Baptiste indicated that [facility] does not take [employer] and that Aldo Baptiste would call the patient to see which DME she wants to switch to and let us know back  Aldo Baptiste also said she would fax back the original order for the NIV since we did not have a copy

## 2019-05-17 NOTE — TELEPHONE ENCOUNTER
Pt is calling to report that her feet are very swollen and feel as if they are going to \"pop\". Pt denies any other symptoms. Swelling has been occurring for about a week, you can reach the pt at [phone].

## 2019-05-17 NOTE — TELEPHONE ENCOUNTER
Called and spoke with pt. PT states she has been having bilateral foot edema x 1-2 weeks. PT states she is eating out a lot, not getting daily exercise, does not have compression stockings, and not following fluid restrictions. Pt states she is currently taking Torsemide 10mg BID (  In chart its Torsemide 20mg BID), Spironolactone 25mg daily. Please advise, thank you.     Last ov 4/26/19 with Dr. Jane Guzman:    Diastolic congestive heart failure  NYHA class II-III    HTN, NIMISHA, Tobacco use

## 2019-05-17 NOTE — TELEPHONE ENCOUNTER
Don James working on getting patients DME. Patients insurance stated no coverage. But patient was here 5/15 insurance was verified. Cherelle states she will try again and if there's a problem she will reach out to the patient.

## 2019-05-22 NOTE — TELEPHONE ENCOUNTER
Called and gave pt instruction to take an extra dose of torsemide today and tomorrow and call us Friday with an update she v/u

## 2019-05-22 NOTE — TELEPHONE ENCOUNTER
I called and spoke to Michael with [employer] and she said that there must have been a misunderstanding in the prior conversations. She was letting us know that they are out of network with [employer]. We will need to send this to a DME that takes her insurance.

## 2019-05-23 ENCOUNTER — OFFICE VISIT (OUTPATIENT)
Dept: PHARMACY | Age: 59
End: 2019-05-23
Payer: COMMERCIAL

## 2019-05-23 VITALS — WEIGHT: 293 LBS | BODY MASS INDEX: 45.6 KG/M2

## 2019-05-23 DIAGNOSIS — J44.9 COPD, SEVERE (HCC): Primary | ICD-10-CM

## 2019-05-23 PROCEDURE — 99214 OFFICE O/P EST MOD 30 MIN: CPT

## 2019-05-23 RX ORDER — FERROUS SULFATE 325(65) MG
325 TABLET ORAL
COMMUNITY

## 2019-05-23 RX ORDER — ARIPIPRAZOLE 5 MG/1
15 TABLET ORAL DAILY
COMMUNITY
End: 2019-08-22

## 2019-05-23 RX ORDER — BUSPIRONE HYDROCHLORIDE 10 MG/1
10 TABLET ORAL 3 TIMES DAILY
COMMUNITY
End: 2019-08-22

## 2019-05-23 RX ORDER — ASCORBIC ACID 500 MG
500 TABLET ORAL 2 TIMES DAILY
COMMUNITY

## 2019-05-23 RX ORDER — CLONAZEPAM 0.5 MG/1
0.5 TABLET ORAL 2 TIMES DAILY PRN
COMMUNITY
End: 2019-08-22

## 2019-05-23 RX ORDER — LISINOPRIL 2.5 MG/1
2.5 TABLET ORAL DAILY
COMMUNITY
End: 2019-07-31 | Stop reason: ALTCHOICE

## 2019-05-23 NOTE — PROGRESS NOTES
years: 40.00    Smokeless tobacco: Never Used    Tobacco comment: 6 cig QD. Substance Use Topics    Alcohol use: Yes    Drug use: No         BMP:   Lab Results   Component Value Date     04/21/2019    K 4.0 04/21/2019    K 5.0 04/17/2019    CL 96 04/21/2019    CO2 34 04/21/2019    BUN 18 04/21/2019    CREATININE 0.5 04/21/2019     CBC:    Lab Results   Component Value Date    WBC 6.7 04/21/2019    HGB 13.6 04/21/2019    HCT 42.5 04/21/2019     04/21/2019       Assessment    Wt (!) 308 lb 12.8 oz (140.1 kg)   BMI 45.60 kg/m²     BP Readings from Last 3 Encounters:   05/16/19 104/67   04/26/19 124/76   04/22/19 121/78       Wt Readings from Last 3 Encounters:   05/23/19 (!) 308 lb 12.8 oz (140.1 kg)   05/16/19 (!) 307 lb (139.3 kg)   04/26/19 297 lb (134.7 kg)       Current medications:  Outpatient Medications Marked as Taking for the 5/23/19 encounter (Office Visit) with SCHEDULE, [facility] SCHEDULE   Medication Sig Dispense Refill    aspirin 81 MG tablet Take 81 mg by mouth daily      lisinopril (PRINIVIL;ZESTRIL) 2.5 MG tablet Take 2.5 mg by mouth daily      clonazePAM (KLONOPIN) 0.5 MG tablet Take 0.5 mg by mouth 2 times daily as needed.       ferrous sulfate 325 (65 Fe) MG tablet Take 325 mg by mouth daily (with breakfast)      sodium chloride, Inhalant, 3 % nebulizer solution Take 4 mLs by nebulization 2 times daily 240 mL 3    Multiple Vitamin (MULTIVITAMIN) tablet Take 1 tablet by mouth daily      torsemide (DEMADEX) 10 MG tablet Take 20 mg by mouth 2 times daily      albuterol sulfate HFA (PROAIR HFA) 108 (90 Base) MCG/ACT inhaler Inhale 2 puffs into the lungs every 4 hours as needed for Wheezing or Shortness of Breath 1 Inhaler 11    albuterol (PROVENTIL) (2.5 MG/3ML) 0.083% nebulizer solution Take 3 mLs by nebulization every 4 hours as needed for Wheezing 360 vial 11    vitamin D (CHOLECALCIFEROL) 1000 UNIT TABS tablet Take 1,000 Units by mouth daily      tiotropium (SPIRIVA HANDIHALER) 18 MCG inhalation capsule Inhale 1 capsule into the lungs daily 30 capsule 6    fluticasone-salmeterol (ADVAIR DISKUS) 250-50 MCG/DOSE AEPB Inhale 1 puff into the lungs every 12 hours 60 each 6    metoprolol succinate (TOPROL XL) 25 MG extended release tablet Take 0.5 tablets by mouth daily (Patient taking differently: Take 50 mg by mouth daily 2 tablets) 30 tablet 5    loratadine (CLARITIN) 10 MG tablet Take 10 mg by mouth daily      spironolactone (ALDACTONE) 25 MG tablet Take 1 tablet by mouth daily 30 tablet 3    QUEtiapine (SEROQUEL) 200 MG tablet Take 200 mg by mouth nightly And 400mg every night      fluticasone (FLONASE) 50 MCG/ACT nasal spray 1 spray by Nasal route daily 1 Bottle 3       Medications reviewed by the Pharmacist: Yes   [x] compared patient's bottles and inhalers with the EPIC list  [] patient did not bring medication bottles / inhalers    Total Discrepancies: 11  COPD Medication Discrepancies: 1      Medication Reconciliation comments:   Patient did not have the following medications in her bag, but they are on her list of medications. She is unsure which ones she should or should not be taking and needs refills if she is supposed to be taking them. I instructed patient to call her doctor to see if she should be taking the following medications and if so, she will need a refill :   Buspirone (Buspar), Aripiprazole (Abilify), Vitamin C, Divalproex 250mg, Lexapro, Zofran, polyethylene glycol (Glycolax), Ranitidine (Zantac). Patient has not received her Nicotine Patches nor her Nicotine gum. These are on patient's home medication list, so I left them on list but checked that she is not taking. I instructed patient to call her Pharmacy to see if they have received approval form  on Aging to pay for her patches and/or gum. Incruse was still on patient medication list and she had been using it.  Dr. Yamile Pereira note from patient's last visit on 5/16/19 stated \"She is on too much LAMA therapy. Needs to stop Incruse. Continue with spiriva, advair and albuterol\". I instructed patient to stop the Incruse and I removed it from patient's medication list. I also had her remove the Incruse inhaler from her bag of medications. She should set it aside once she gets home. Did not have time to review COPD medications including how they work and potential side effects. Will need to address at next visit.     Encouraged smoking cessation:  Yes      [facility] COPD Service Follow-up: Yes     Follow-up visit will include:   [x] Comprehensive Medication Review with the Pharmacist  [x] Smoking cessation   [] Other,           Electronically signed by TRINA Mckinnon [facility] on 5/23/2019 at 3:24 PM

## 2019-05-23 NOTE — PATIENT INSTRUCTIONS
1. Check with your physician as to whether to continue buspirone (Buspar). If so, you need a refill. 2. Check to see if you need to take Aripiprazole (Abilify). If so, you need a refill. 3. Check to see if you should be taking vitamin C. Is so, you need a refill. 4. Check with your physician as to whether you should be taking Divalproex 250mg. If so, you need a refill. 5. Check with your physician as to whether you should be taking Lexapro. If so, you will need a refill. 6. You need a refill on Zofran. 7. You need a refill on polyethylene glycol (Glycolax). 8. You need a refill om Ranitidine (Zantac). 9. Stop Incruse as instructed by Dr. Ulises Arnold at your last visit on 5/16/19.  10. Call [address] [place] [address] to see if they have approval for your Nicotine Patches and/or gum.

## 2019-07-03 ENCOUNTER — OFFICE VISIT (OUTPATIENT)
Dept: PHARMACY | Age: 59
End: 2019-07-03
Payer: COMMERCIAL

## 2019-07-03 VITALS
WEIGHT: 293 LBS | SYSTOLIC BLOOD PRESSURE: 100 MMHG | DIASTOLIC BLOOD PRESSURE: 68 MMHG | BODY MASS INDEX: 45.13 KG/M2 | OXYGEN SATURATION: 91 %

## 2019-07-03 DIAGNOSIS — J44.9 COPD, SEVERE (HCC): Primary | ICD-10-CM

## 2019-07-03 PROCEDURE — 99213 OFFICE O/P EST LOW 20 MIN: CPT

## 2019-07-03 NOTE — PROGRESS NOTES
Social History     Tobacco Use    Smoking status: Heavy Tobacco Smoker     Packs/day: 1.00     Years: 40.00     Pack years: 40.00    Smokeless tobacco: Never Used    Tobacco comment: 6 cig QD. Substance Use Topics    Alcohol use: Yes    Drug use: No         BMP:   Lab Results   Component Value Date     04/21/2019    K 4.0 04/21/2019    K 5.0 04/17/2019    CL 96 04/21/2019    CO2 34 04/21/2019    BUN 18 04/21/2019    CREATININE 0.5 04/21/2019     CBC:    Lab Results   Component Value Date    WBC 6.7 04/21/2019    HGB 13.6 04/21/2019    HCT 42.5 04/21/2019     04/21/2019       Assessment    /68 (Site: Right Upper Arm, Position: Sitting, Cuff Size: Large Adult)   Wt (!) 305 lb 9.6 oz (138.6 kg)   SpO2 91%   BMI 45.13 kg/m²     BP Readings from Last 3 Encounters:   07/03/19 100/68   05/16/19 104/67   04/26/19 124/76       Wt Readings from Last 3 Encounters:   07/03/19 (!) 305 lb 9.6 oz (138.6 kg)   05/23/19 (!) 308 lb 12.8 oz (140.1 kg)   05/16/19 (!) 307 lb (139.3 kg)       Current medications:  Outpatient Medications Marked as Taking for the 7/3/19 encounter (Office Visit) with [facility] SCHEDULE   Medication Sig Dispense Refill    aspirin 81 MG tablet Take 81 mg by mouth daily      lisinopril (PRINIVIL;ZESTRIL) 2.5 MG tablet Take 2.5 mg by mouth daily      clonazePAM (KLONOPIN) 0.5 MG tablet Take 0.5 mg by mouth 2 times daily as needed.       ferrous sulfate 325 (65 Fe) MG tablet Take 325 mg by mouth daily (with breakfast)      divalproex (DEPAKOTE ER) 250 MG extended release tablet Take 250 mg by mouth daily And 2 tablets every evening      Multiple Vitamin (MULTIVITAMIN) tablet Take 1 tablet by mouth daily      torsemide (DEMADEX) 10 MG tablet Take 20 mg by mouth 2 times daily      albuterol sulfate HFA (PROAIR HFA) 108 (90 Base) MCG/ACT inhaler Inhale 2 puffs into the lungs every 4 hours as needed for Wheezing or Shortness of Breath 1 Inhaler 11    albuterol

## 2019-07-15 RX ORDER — TORSEMIDE 10 MG/1
TABLET ORAL
Qty: 120 TABLET | Refills: 2 | Status: SHIPPED | OUTPATIENT
Start: 2019-07-15 | End: 2019-09-06 | Stop reason: SDUPTHER

## 2019-07-18 ENCOUNTER — OFFICE VISIT (OUTPATIENT)
Dept: PULMONOLOGY | Age: 59
End: 2019-07-18
Payer: COMMERCIAL

## 2019-07-18 VITALS
OXYGEN SATURATION: 91 % | RESPIRATION RATE: 20 BRPM | WEIGHT: 293 LBS | DIASTOLIC BLOOD PRESSURE: 80 MMHG | TEMPERATURE: 97.1 F | HEIGHT: 69 IN | SYSTOLIC BLOOD PRESSURE: 122 MMHG | BODY MASS INDEX: 43.4 KG/M2 | HEART RATE: 111 BPM

## 2019-07-18 DIAGNOSIS — J96.11 CHRONIC RESPIRATORY FAILURE WITH HYPOXIA AND HYPERCAPNIA (HCC): ICD-10-CM

## 2019-07-18 DIAGNOSIS — Z72.0 TOBACCO ABUSE: ICD-10-CM

## 2019-07-18 DIAGNOSIS — Z87.891 PERSONAL HISTORY OF TOBACCO USE: ICD-10-CM

## 2019-07-18 DIAGNOSIS — J39.8 TRACHEAL STENOSIS: ICD-10-CM

## 2019-07-18 DIAGNOSIS — J96.12 CHRONIC RESPIRATORY FAILURE WITH HYPOXIA AND HYPERCAPNIA (HCC): ICD-10-CM

## 2019-07-18 DIAGNOSIS — J44.9 COPD, SEVERE (HCC): Primary | ICD-10-CM

## 2019-07-18 PROBLEM — J96.22 ACUTE ON CHRONIC RESPIRATORY FAILURE WITH HYPOXIA AND HYPERCAPNIA (HCC): Status: RESOLVED | Noted: 2017-01-29 | Resolved: 2019-07-18

## 2019-07-18 PROBLEM — J96.21 ACUTE ON CHRONIC RESPIRATORY FAILURE WITH HYPOXIA AND HYPERCAPNIA (HCC): Status: RESOLVED | Noted: 2017-01-29 | Resolved: 2019-07-18

## 2019-07-18 PROCEDURE — 99214 OFFICE O/P EST MOD 30 MIN: CPT | Performed by: INTERNAL MEDICINE

## 2019-07-18 PROCEDURE — G0296 VISIT TO DETERM LDCT ELIG: HCPCS | Performed by: INTERNAL MEDICINE

## 2019-07-18 NOTE — PROGRESS NOTES
Controlled breathing for the most part, more smoking   ABDOMEN:  No abdominal pains, no changes in stools  GENITOURINARY:  No increased frequency, no blood in urine  EXTREMITIES: chronic swelling, no lesions; leg pains  NEURO:  No numbness or tingling, no seizures; back pains  SKIN:  No new rashes, no changes in hair or nails  LYMPH:  No masses, no swelling neck, armpits, or groin    PHYSICAL EXAM:  Vitals:    07/18/19 1343   BP: 122/80   Pulse: 111   Resp: 20   Temp: 97.1 °F (36.2 °C)   SpO2: 91%       GENERAL:  Chronically ill much more awake and alert   HEENT:  Muffled voice, Mallampati IV  NECK:  Post-trach scar, no adenopathy, no stridor  LYMPH:  No cervical or supraclavicular adenopathy  HEART:  Regular rate and rhythm, no murmurs. Distant heart   LUNGS:  Isolated wheezing   ABDOMEN:  No distention, no organomegaly  EXTREMITIES:  +  edema, no digital clubbing, stasis changes   NEURO:  No localizing deficits, CN II-XII intact. Has walker     Pulmonary Function Testing 1/2017  Severe Obstruction  Suggestion of Restriction    Chest imaging from 8/2018 is reviewed. My impression is slightly narrowed trachea, emphysema, motion artifact, bands of atelectasis    Chest imaging from 4/2019 is reviewed. My impression is right hilar prominence, fluid in fissure and possible airspace disease    ECHO 4/2019  Left ventricular cavity size is normal.   Normal left ventricular wall thickness.   Left ventricular wall motion is normal with LVEF 55%   The right ventricle has preserved systolic function on 2-D imaging.   Lab Results   Component Value Date    WBC 6.7 04/21/2019    HGB 13.6 04/21/2019    HCT 42.5 04/21/2019    MCV 81.9 04/21/2019     04/21/2019       Lab Results   Component Value Date    .0 (H) 02/04/2015       Lab Results   Component Value Date    CREATININE 0.5 (L) 04/21/2019    BUN 18 04/21/2019     04/21/2019    K 4.0 04/21/2019    CL 96 (L) 04/21/2019    CO2 34 (H) 04/21/2019     ABG:

## 2019-07-23 ENCOUNTER — TELEPHONE (OUTPATIENT)
Dept: PULMONOLOGY | Age: 59
End: 2019-07-23

## 2019-07-31 ENCOUNTER — OFFICE VISIT (OUTPATIENT)
Dept: PHARMACY | Age: 59
End: 2019-07-31
Payer: COMMERCIAL

## 2019-07-31 DIAGNOSIS — J44.9 COPD, SEVERE (HCC): ICD-10-CM

## 2019-07-31 PROCEDURE — 99213 OFFICE O/P EST LOW 20 MIN: CPT

## 2019-07-31 RX ORDER — NICOTINE 21 MG/24HR
1 PATCH, TRANSDERMAL 24 HOURS TRANSDERMAL EVERY 24 HOURS
COMMUNITY

## 2019-07-31 RX ORDER — GUAIFENESIN 600 MG/1
1200 TABLET, EXTENDED RELEASE ORAL 2 TIMES DAILY
Qty: 40 TABLET | Refills: 0 | Status: SHIPPED | OUTPATIENT
Start: 2019-07-31 | End: 2019-08-10

## 2019-07-31 RX ORDER — SODIUM CHLORIDE FOR INHALATION 3 %
4 VIAL, NEBULIZER (ML) INHALATION 2 TIMES DAILY
Qty: 240 ML | Refills: 3 | Status: SHIPPED | OUTPATIENT
Start: 2019-07-31

## 2019-07-31 NOTE — PATIENT INSTRUCTIONS
1. You can get your labs today in the medical office building at [address]  2. The physical therapy and pulmonary rehab are on the 5th floor of the medical office building. Pulmonary rehab's number is [phone].  3. Plan a quit date and think about your plan to quit smoking  4. We will send over Mucinex to your pharmacy. This is a pill that will help loosen up the mucus in your chest and help to cough it out. 5. I will reach out to Dr. Alhaji Coyne to see about your Buspar, Abilify, clonazepam, and Lexapro. 6. Use your Acapella machine to help loosen up the mucus as well. This is the hand held machine you blow into. 7. You can also use sodium chloride with your nebulizer to help loosen up the mucus. We will have this filled at your pharmacy as well.

## 2019-08-05 ENCOUNTER — HOSPITAL ENCOUNTER (OUTPATIENT)
Dept: CT IMAGING | Age: 59
Discharge: HOME | End: 2019-08-05
Payer: COMMERCIAL

## 2019-08-05 DIAGNOSIS — Z87.891 PERSONAL HISTORY OF TOBACCO USE: ICD-10-CM

## 2019-08-05 PROCEDURE — G0297 LDCT FOR LUNG CA SCREEN: HCPCS

## 2019-08-07 ENCOUNTER — OFFICE VISIT (OUTPATIENT)
Dept: PHARMACY | Age: 59
End: 2019-08-07
Payer: COMMERCIAL

## 2019-08-07 ENCOUNTER — APPOINTMENT (OUTPATIENT)
Dept: PHARMACY | Age: 59
End: 2019-08-07
Payer: COMMERCIAL

## 2019-08-07 VITALS
OXYGEN SATURATION: 94 % | WEIGHT: 293 LBS | BODY MASS INDEX: 44.01 KG/M2 | HEART RATE: 100 BPM | DIASTOLIC BLOOD PRESSURE: 78 MMHG | SYSTOLIC BLOOD PRESSURE: 107 MMHG

## 2019-08-07 DIAGNOSIS — J44.9 COPD, SEVERE (HCC): Primary | ICD-10-CM

## 2019-08-07 PROCEDURE — 99213 OFFICE O/P EST LOW 20 MIN: CPT

## 2019-08-07 NOTE — PROGRESS NOTES
[facility]  Pharmacist  COPD Service    [address], [address]  Phone: [phone]  Fax: [phone]      NAME: Divina Nickerson  MEDICAL RECORD NUMBER:  [id number]  AGE: 59 y.o. GENDER: female  : 1960  EPISODE DATE:  2019      [address] referral by Dr. Mallorie Fisher   Ms. Wendy Hoffmann is a 59 y.o. female here for the COPD Services. They are here today for a comprehensive medication review including over the counter medications and herbal products, overall wellbeing assessment, transition of care and any needed adjustments with updates and recommendations communicated to the referring physician. Patient Findings:   []  Worsening shortness of breath  []  Increased shortness of breath with exertion  []  Cough  []  Night time cough     []  Acute illness  []  Missed doses  []  Other    Comments:  She reports she is feeling better. Mucinex and NaCl per nebulizer is helping. Objective     Patient Active Problem List   Diagnosis Code    Blurred vision H53.8    Cataract of both eyes H26.9    Progressive high myopia H44.20    Acute CHF (congestive heart failure) ([facility]) I50.9    Hypotension I95.9    Obesity, morbid ([facility]) E66.01    Tobacco abuse Z72.0    Schizoaffective disorder ([facility] [address] F25.9    ARF (acute renal failure) ([facility] [address] N17.9    Microcytosis R71.8    Abnormal ECG R94.31    RICHARD (acute kidney injury) ([facility] [address] N17.9    Acute on chronic diastolic heart failure ([facility]) I50.33    COPD, severe ([facility]) J44.9    Chronic respiratory failure with hypoxia and hypercapnia ([facility]) J96.11, J96.12    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 Substance Use Topics    Alcohol use:  Yes    Drug use: No         BMP:   Lab Results   Component

## 2019-08-10 DIAGNOSIS — J44.9 COPD, SEVERE (HCC): ICD-10-CM

## 2019-08-12 RX ORDER — UMECLIDINIUM 62.5 UG/1
AEROSOL, POWDER ORAL
Refills: 6 | OUTPATIENT
Start: 2019-08-12

## 2019-08-13 ENCOUNTER — HOSPITAL ENCOUNTER (OUTPATIENT)
Dept: WOMENS IMAGING | Age: 59
Discharge: HOME | End: 2019-08-13
Payer: COMMERCIAL

## 2019-08-13 DIAGNOSIS — Z12.31 VISIT FOR SCREENING MAMMOGRAM: ICD-10-CM

## 2019-08-13 PROCEDURE — 77067 SCR MAMMO BI INCL CAD: CPT

## 2019-08-16 ENCOUNTER — HOSPITAL ENCOUNTER (OUTPATIENT)
Dept: PHYSICAL THERAPY | Age: 59
Setting detail: THERAPIES SERIES
Discharge: HOME | End: 2019-08-16
Payer: COMMERCIAL

## 2019-08-16 PROCEDURE — 97162 PT EVAL MOD COMPLEX 30 MIN: CPT | Performed by: PHYSICAL THERAPIST

## 2019-08-16 PROCEDURE — 97530 THERAPEUTIC ACTIVITIES: CPT | Performed by: PHYSICAL THERAPIST

## 2019-08-22 ENCOUNTER — OFFICE VISIT (OUTPATIENT)
Dept: PHARMACY | Age: 59
End: 2019-08-22
Payer: COMMERCIAL

## 2019-08-22 VITALS — DIASTOLIC BLOOD PRESSURE: 73 MMHG | WEIGHT: 293 LBS | SYSTOLIC BLOOD PRESSURE: 103 MMHG | BODY MASS INDEX: 44.57 KG/M2

## 2019-08-22 DIAGNOSIS — E66.01 OBESITY, MORBID (HCC): ICD-10-CM

## 2019-08-22 DIAGNOSIS — I50.33 ACUTE ON CHRONIC DIASTOLIC HEART FAILURE (HCC): Primary | ICD-10-CM

## 2019-08-22 DIAGNOSIS — J44.9 COPD, SEVERE (HCC): ICD-10-CM

## 2019-08-22 DIAGNOSIS — Z72.0 TOBACCO ABUSE: ICD-10-CM

## 2019-08-22 RX ORDER — ONDANSETRON 4 MG/1
4 TABLET, FILM COATED ORAL EVERY 8 HOURS PRN
COMMUNITY

## 2019-08-23 RX ORDER — GUAIFENESIN 600 MG/1
1200 TABLET, EXTENDED RELEASE ORAL 2 TIMES DAILY
Qty: 40 TABLET | Refills: 0 | Status: SHIPPED | OUTPATIENT
Start: 2019-08-23 | End: 2019-09-02

## 2019-08-23 NOTE — ASSESSMENT & PLAN NOTE
Education surrounding nutrition and calorie intake provided today. Patient verbalized understanding that weight loss will help with her chronic conditions. Will continue to  at follow-up visits.

## 2019-08-23 NOTE — PROGRESS NOTES
Last Hospitalization: 4/2019    History of NIMISHA: Unknown, Uses NIV     Subjective   HPI: Ms. Valente Matson is a 59 y.o. female here for the Heart Failure Services. She also has a history of COPD, tobacco abuse, and morbid obesity. She denies any acute symptoms today beyond her baseline. States that she is having difficulty sleeping because she has frequent urination at night. She tells me that she is not weighing herself daily because she does not have a working scale. One will be provided for her today. She is still smoking cigarettes but reports that she has cut back to 6 cigarettes/day. States the NicoDerm patch did not help her but it is mind over matter she says. She is not currently exercising. Her goals of care are that she wants to get out of the house and stay out of the house to be active, stay out of the hospital and she does not want to live in a nursing home. She has a home health nurse who comes and feels her meds set weekly. She is very uneducated about the medications that she is taking and does not seem to be very interested in learning about the medications. Review of Systems:   Constitutional: + fatigue, Negative for activity change, appetite change, chills, diaphoresis, fever and unexpected weight change. HENT: Negative for congestion, ear pain, postnasal drip, rhinorrhea, sinus pressure, sinus pain, sneezing, sore throat and trouble swallowing. Eyes: Negative for discharge and redness. Respiratory: + cough, negative chest tightness, + sputum, negative shortness of breath and wheezing. Cardiovascular: Negative for chest pain, palpitations and +leg swelling. Gastrointestinal: Negative for abdominal distention, abdominal pain, constipation, diarrhea, nausea and vomiting. Genitourinary: Negative for decreased urine volume, difficulty urinating, dysuria and hematuria. Skin: Negative for pallor and rash.    Neurological: Negative for dizziness, tremors, weakness, light-headedness and headaches. Psychiatric/Behavioral: Negative for confusion and sleep disturbance. The patient is not nervous/anxious. Sleep: + restless sleep, unknown snoring, +frequent waking       Functional Activity New York Heart Association Classification  Patient Symptoms   []   Class I No limitation of physical activity. Ordinary physical activity does not cause undue fatigue, palpitation, dyspnea (shortness of breath). [x]   Class II Slight limitation of physical activity. Comfortable at rest. Ordinary physical activity results in fatigue, palpitation, dyspnea (shortness of breath). []   Class III  Isaac limitation of physical activity. Comfortable at rest.  Less than ordinary activity causes fatigue, palpitation, dyspnea. []   Class IV Unable to carry on any physical activity without discomfort. Symptoms of heart failure at rest.  If any physical activity is undertaken, discomfort increases. Shortness of Breath:  []   None   [x]   Dyspnea on exertion   [x]   Dyspnea with normal activities  []   Dyspnea at rest  []   Dyspnea while sleeping    Patient Findings:   []  Missed doses  []  Diet changes  []  Sodium intake changes    []  Alcohol intake changes  []  Night time cough  [x]  Edema    []  Activity changes   [x]  Fatigue that limits activity []  Acute illness  []  Early saiety, abd fullness []  Chest pain   []  Other        Objective:   /73   Wt (!) 301 lb 12.8 oz (136.9 kg)   BMI 44.57 kg/m²   BP Readings from Last 3 Encounters:   08/22/19 103/73   08/07/19 107/78   07/18/19 122/80     Wt Readings from Last 6 Encounters:   08/22/19 (!) 301 lb 12.8 oz (136.9 kg)   08/07/19 298 lb (135.2 kg)   07/18/19 (!) 304 lb (137.9 kg)   07/03/19 (!) 305 lb 9.6 oz (138.6 kg)   05/23/19 (!) 308 lb 12.8 oz (140.1 kg)   05/16/19 (!) 307 lb (139.3 kg)     Physical Exam:   Constitutional: Oriented to person, place, and time. Appears well-developed and well-nourished. No distress.    HENT:   Head: Normocephalic and atraumatic. Right Ear: External ear normal.   Left Ear: External ear normal.   Eyes: Pupils are equal, round, and reactive to light. Conjunctivae and EOM are normal.   Neck: Normal range of motion. Neck supple. No JVD present. No tracheal deviation present. Cardiovascular: Normal rate, regular rhythm and normal heart sounds. No murmur heard. Pulmonary/Chest: Effort normal and +congested cough and Rhonchi. No respiratory distress. No wheezes. No rales. Abdominal: Soft. Bowel sounds are normal. No distension. There is no tenderness. Musculoskeletal: Normal range of motion. +1 BLE edema. Neurological: Alert and oriented to person, place, and time. Skin: Skin is warm and dry. Capillary refill takes less than 2 seconds. No rash noted. He is not diaphoretic. Psychiatric: Normal mood and affect.      BMP:   Lab Results   Component Value Date     07/31/2019    K 4.7 07/31/2019    K 5.0 04/17/2019    CL 98 07/31/2019    CO2 30 07/31/2019    BUN 13 07/31/2019    CREATININE 0.7 07/31/2019       CBC:    Lab Results   Component Value Date    WBC 6.8 07/31/2019    HGB 15.2 07/31/2019    HCT 48.8 07/31/2019     07/31/2019     HgA1C: No results found for: LABA1C  TSH: No results found for: TSH  Lipids:   Lab Results   Component Value Date    CHOL 143 07/31/2019    TRIG 113 07/31/2019    HDL 49 07/31/2019    LDLCALC 71 07/31/2019     ProBNP:   Lab Results   Component Value Date    PROBNP 90 04/19/2019    PROBNP 791 04/16/2019    PROBNP 1,063 01/31/2017       [x]Reviewed daily weight log and assessed self management skills including early recognition and notification of exacerbation   [x]Encouraged daily weights and to call with increase of 3 pounds in one day or 5 pounds in one week or weight increased above dry weight    [x]Discussed fluid restriction and sodium restriction as well as nutrition goals   Weight loss counseling performed        Current medications:  Outpatient Medications minutes. *This note was transcribed using [id number] [employer]. Please disregard any translational errors.          Electronically signed by ANNE MARIE Fairbanks CNP, PharmD on 8/23/2019 at 4:05 PM

## 2019-08-24 NOTE — PROGRESS NOTES
sleep    Objective / Assessment     Patient Active Problem List   Diagnosis Code    Blurred vision H53.8    Cataract of both eyes H26.9    Progressive high myopia H44.20    Acute CHF (congestive heart failure) ([facility]) I50.9    Hypotension I95.9    Obesity, morbid ([facility]) E66.01    Tobacco abuse Z72.0    Schizoaffective disorder ([facility] [address] F25.9    ARF (acute renal failure) ([facility] [address] N17.9    Microcytosis R71.8    Abnormal ECG R94.31    RICHARD (acute kidney injury) ([facility] [address] N17.9    Acute on chronic diastolic heart failure ([facility]) I50.33    COPD, severe ([facility]) J44.9    Chronic respiratory failure with hypoxia and hypercapnia ([facility]) J96.11, J96.12    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 Substance Use Topics    Alcohol use: Yes    Drug use: No         BMP:   Lab Results   Component Value Date     07/31/2019    K 4.7 07/31/2019    K 5.0 04/17/2019    CL 98 07/31/2019    CO2 30 07/31/2019    BUN 13 07/31/2019    CREATININE 0.7 07/31/2019     Addressed abnormal BMP results: WNL. If elevated serum creatinine, medications reviewed for appropriateness / dose adjustment / recommendations: WNL. CBC:    Lab Results   Component Value Date    WBC 6.8 07/31/2019    HGB 15.2 07/31/2019    HCT 48.8 07/31/2019     07/31/2019     Addressed abnormal CBC results: WNL.  If anemia, receiving proper care/ follow up: N/A     HgA1C: No results found for: LABA1C    TSH: (ref range 0.27 - 4.20 uIU/mL)  No results found for: TSH    Lipids:   Lab Results   Component Value Date    CHOL 143 07/31/2019    TRIG 113 07/31/2019    HDL 49 07/31/2019    LDLCALC 71 07/31/2019     Reviewed LDL and recommended therapy if appropriate: close to goal.   Ms. Jewels Mckay is on a Statin No     ProBNP:   Lab Results   Component Value Date    PROBNP 90 04/19/2019 PROBNP [phone] 04/16/2019    PROBNP 1,063 01/31/2017       Reviewed need for labs this visit    /73   Wt (!) 301 lb 12.8 oz (136.9 kg)   BMI 44.57 kg/m²     BP Readings from Last 3 Encounters:   08/22/19 103/73   08/07/19 107/78   07/18/19 122/80       Addressed hypertension / hypotension: at goal    Wt Readings from Last 6 Encounters:   08/22/19 (!) 301 lb 12.8 oz (136.9 kg)   08/07/19 298 lb (135.2 kg)   07/18/19 (!) 304 lb (137.9 kg)   07/03/19 (!) 305 lb 9.6 oz (138.6 kg)   05/23/19 (!) 308 lb 12.8 oz (140.1 kg)   05/16/19 (!) 307 lb (139.3 kg)       Addressed weight gain / loss with regards to heart failure: stable. Recommended weight loss if appropriate: No, not this visit. Reviewed daily weight log and assessed self management skills    Encouraged daily weights and to call with increase of 3 pounds in one day or 5 pounds in one week or weight increased above dry weight      Encouraged smoking cessation and alcohol abstinence. Contemplating quitting smoking, a beer every other day. Medications reviewed by the Pharmacist: Yes   [x] compared patient's bottles with EPIC list  [] patient did not bring medication bottles    Total Discrepancies: 5  Heart Failure Medication Discrepancies: 0      Medication Reconciliation Discrepancies:  1. Did not have Vitamin C bottle with her  2. Removed Abilify - not taking  3. Removed Buspar - not taking  4. Removed klonopin - not taking  5. Removed lexapro - not taking    Medication Reconciliation comments:   2-5 above she has not been taking for quite some time. I could not get a call back from Dr. De Leon Handing office (2 attempts). I removed these. She has multiple bottles of the same medications. I combined the pills where I could. She had three bottles of spironolactone and 4 different manufacturers, pill shapes and colors. Adds to the confusion. I verified. She did have some torsemide in w the spironolactone. I  these.      She would like to

## 2019-08-26 ENCOUNTER — TELEPHONE (OUTPATIENT)
Dept: PHARMACY | Age: 59
End: 2019-08-26

## 2019-09-09 RX ORDER — TORSEMIDE 10 MG/1
TABLET ORAL
Qty: 120 TABLET | Refills: 2 | Status: SHIPPED | OUTPATIENT
Start: 2019-09-09 | End: 2019-12-05 | Stop reason: SDUPTHER

## 2019-09-10 ENCOUNTER — APPOINTMENT (OUTPATIENT)
Dept: PHARMACY | Age: 59
End: 2019-09-10
Payer: COMMERCIAL

## 2019-09-16 ENCOUNTER — OFFICE VISIT (OUTPATIENT)
Dept: PHARMACY | Age: 59
End: 2019-09-16
Payer: COMMERCIAL

## 2019-09-16 VITALS
SYSTOLIC BLOOD PRESSURE: 116 MMHG | DIASTOLIC BLOOD PRESSURE: 80 MMHG | BODY MASS INDEX: 45.34 KG/M2 | OXYGEN SATURATION: 95 % | HEART RATE: 96 BPM | WEIGHT: 293 LBS

## 2019-09-16 DIAGNOSIS — I50.33 ACUTE ON CHRONIC DIASTOLIC HEART FAILURE (HCC): ICD-10-CM

## 2019-09-16 PROCEDURE — 99213 OFFICE O/P EST LOW 20 MIN: CPT

## 2019-09-16 NOTE — PATIENT INSTRUCTIONS
HEART FAILURE:    With heart failure you must follow up with your Cardiologist, your PCP and other physicians as appropriate. Especially 7 days after a hospitalization and then as directed. Please call right away with any signs or symptoms of heart failure or other medical conditions that need attention or with any questions at all. Please call your Cardiologist or your PCP or the [facility] at [phone]. We can help manage these symptoms and often prevent a visit to the Emergency Room or hospitalization. * Know your dry weight (your weight without any fluid on board)    * Call any time you have questions about anything. Do not wait. * It is very important to take your medications as prescribed, do not miss any doses. Call for refills before you run out. Typically when you have one week left. If you have trouble getting your medications for any reason, call us at [phone]. * Avoid NSAIDs (non steroidal anti inflammatory drugs) like Aleve (naproxen), Advil and Motrin (ibuprofen), Mobic (diclofenac), Celebrex (celecoxib) and aspirin. A daily aspirin is okay if recommended by your physician. It is okay to take Tylenol (acetaminophen) unless your physician has told you    otherwise. * Limit fluid intake. Typically this is no more than 1,500-2,000 milliliters (mL) per day. This is a liter and a half to two liters. This is equal to approximately 48-64 ounces (oz) per day    * Limit sodium intake. Typically this is no more than 2,000-2,400 milligrams (mg) per day. You will need to add up the sodium content found on the nutrition label for the foods you eat each day. * Weigh yourself every day. Weigh yourself before breakfast and after you go to the restroom. Wear the same thing each time you weigh yourself. Keep a log and bring it to each visit. Call if you gain more than 3 pounds in one day.  [phone]   Call if you gain more than

## 2019-09-30 ENCOUNTER — OFFICE VISIT (OUTPATIENT)
Dept: PHARMACY | Age: 59
End: 2019-09-30
Payer: COMMERCIAL

## 2019-09-30 VITALS
DIASTOLIC BLOOD PRESSURE: 77 MMHG | HEART RATE: 110 BPM | WEIGHT: 293 LBS | SYSTOLIC BLOOD PRESSURE: 113 MMHG | BODY MASS INDEX: 45.54 KG/M2 | OXYGEN SATURATION: 92 %

## 2019-09-30 DIAGNOSIS — I50.33 ACUTE ON CHRONIC DIASTOLIC HEART FAILURE (HCC): Primary | ICD-10-CM

## 2019-09-30 PROCEDURE — 99213 OFFICE O/P EST LOW 20 MIN: CPT

## 2019-09-30 NOTE — PROGRESS NOTES
Packs/day: 1.00     Years: 40.00     Pack years: 40.00    Smokeless tobacco: Never Used    Tobacco comment: 6 cig QD. Substance Use Topics    Alcohol use: Yes    Drug use: No         BMP:   Lab Results   Component Value Date     07/31/2019    K 4.7 07/31/2019    K 5.0 04/17/2019    CL 98 07/31/2019    CO2 30 07/31/2019    BUN 13 07/31/2019    CREATININE 0.7 07/31/2019     Addressed abnormal BMP results: No: WNL. CBC:    Lab Results   Component Value Date    WBC 6.8 07/31/2019    HGB 15.2 07/31/2019    HCT 48.8 07/31/2019     07/31/2019     Addressed abnormal CBC results: No: n/a. If anemia, receiving proper care/ follow up: No: -     HgA1C: No results found for: LABA1C  Addressed elevated HgA1c: No: consider checking one. If Diabetes / Prediabetes, receiving proper care/ follow up: No: glucose WNL on last draw     TSH: (ref range 0.27 - 4.20 uIU/mL)  No results found for: TSH    Lipids:   Lab Results   Component Value Date    CHOL 143 07/31/2019    TRIG 113 07/31/2019    HDL 49 07/31/2019    LDLCALC 71 07/31/2019     Reviewed LDL and recommended therapy if appropriate: Yes. Close to goal  Ms. Jewels Mckay is on a Statin No     ProBNP:   Lab Results   Component Value Date    PROBNP 90 04/19/2019    PROBNP 791 04/16/2019    PROBNP 1,063 01/31/2017       Reviewed need for labs this visit: none needed BMP WNL end of July.  No dose changes/medication changes     /77   Pulse 110   Wt (!) 308 lb 6.4 oz (139.9 kg)   SpO2 92%   BMI 45.54 kg/m²     BP Readings from Last 3 Encounters:   09/30/19 113/77   09/16/19 116/80   08/22/19 103/73       Addressed hypertension / hypotension: controlled today    Wt Readings from Last 6 Encounters:   09/30/19 (!) 308 lb 6.4 oz (139.9 kg)   09/16/19 (!) 307 lb (139.3 kg)   08/22/19 (!) 301 lb 12.8 oz (136.9 kg)   08/07/19 298 lb (135.2 kg)   07/18/19 (!) 304 lb (137.9 kg)   07/03/19 (!) 305 lb 9.6 oz (138.6 kg)       Addressed weight gain / loss with regards to need with the patient to limit her sodium intake as this will affect her weight. She will take it off and give to her friend. States she does \"not want to be this way\" referring to her health. She does have good insight into what it takes to make changes and the changes she needs to make. She recalled what happened to make her gain the weight. Encouraged patient that she has what it takes to continue making the changes to improve her health (already able to cut down on smoking in two weeks). Reassured her that it does take time. She will focus on adding in healthier foods like more fruits and vegetables into her diet in the coming weeks. Has a visit with our dietician in two weeks, so this will be a good time to discuss any questions she has. She has a good understanding of what she needs to eat more of and less of. Introduced the mindset of just adding in more rather than avoidance of food as it may just make her want that food more. Sticking with her fluid intake. Sounds like she needs to work on limiting sodium from what she described eating. Has no signs of fluid overload at this time. Reminded her to call us with any weight gain of 3 lb/day or 5 lb/week. Plan     Action taken, including but not limited to:  [x] education / reinforcement  [] assistance with medication refills, etc.  [] assistance with making appropriate appointments  [] priya labs as appropriate  [] other   [] call placed to physician to address concerns:  [] medication changes:   [x] no medication changes: Follow up plan:   2 weeks with dietician and pharmacist for COPD.      Recommendations / Notes to the physician:  Support for weight management and smoking cessation          Electronically signed by TRINA Aceves [facility], PharmD on 9/30/2019 at 3:04 PM

## 2019-10-16 ENCOUNTER — OFFICE VISIT (OUTPATIENT)
Dept: PHARMACY | Age: 59
End: 2019-10-16
Payer: COMMERCIAL

## 2019-10-16 VITALS — HEART RATE: 102 BPM | WEIGHT: 293 LBS | OXYGEN SATURATION: 96 % | BODY MASS INDEX: 46.15 KG/M2

## 2019-10-16 DIAGNOSIS — J44.9 COPD, SEVERE (HCC): Primary | ICD-10-CM

## 2019-10-16 DIAGNOSIS — I50.33 ACUTE ON CHRONIC DIASTOLIC HEART FAILURE (HCC): Primary | ICD-10-CM

## 2019-10-16 PROCEDURE — 99213 OFFICE O/P EST LOW 20 MIN: CPT | Performed by: NURSE PRACTITIONER

## 2019-10-16 RX ORDER — DOXYCYCLINE HYCLATE 100 MG
100 TABLET ORAL 2 TIMES DAILY
Qty: 20 TABLET | Refills: 0 | Status: SHIPPED | OUTPATIENT
Start: 2019-10-16 | End: 2019-10-26

## 2019-10-16 RX ORDER — GUAIFENESIN 600 MG/1
1200 TABLET, EXTENDED RELEASE ORAL 2 TIMES DAILY
Qty: 40 TABLET | Refills: 0 | Status: SHIPPED | OUTPATIENT
Start: 2019-10-16 | End: 2019-10-26

## 2019-10-17 ENCOUNTER — OFFICE VISIT (OUTPATIENT)
Dept: PULMONOLOGY | Age: 59
End: 2019-10-17
Payer: COMMERCIAL

## 2019-10-17 VITALS
RESPIRATION RATE: 16 BRPM | DIASTOLIC BLOOD PRESSURE: 89 MMHG | HEART RATE: 117 BPM | BODY MASS INDEX: 43.4 KG/M2 | OXYGEN SATURATION: 92 % | HEIGHT: 69 IN | SYSTOLIC BLOOD PRESSURE: 133 MMHG | WEIGHT: 293 LBS | TEMPERATURE: 97 F

## 2019-10-17 DIAGNOSIS — J39.8 TRACHEAL STENOSIS: ICD-10-CM

## 2019-10-17 DIAGNOSIS — Z72.0 TOBACCO ABUSE: ICD-10-CM

## 2019-10-17 DIAGNOSIS — J96.11 CHRONIC RESPIRATORY FAILURE WITH HYPOXIA AND HYPERCAPNIA (HCC): ICD-10-CM

## 2019-10-17 DIAGNOSIS — J44.9 COPD, SEVERE (HCC): Primary | ICD-10-CM

## 2019-10-17 DIAGNOSIS — J96.12 CHRONIC RESPIRATORY FAILURE WITH HYPOXIA AND HYPERCAPNIA (HCC): ICD-10-CM

## 2019-10-17 PROCEDURE — 99214 OFFICE O/P EST MOD 30 MIN: CPT | Performed by: INTERNAL MEDICINE

## 2019-10-17 RX ORDER — PREDNISONE 10 MG/1
40 TABLET ORAL DAILY
Qty: 20 TABLET | Refills: 0 | Status: SHIPPED | OUTPATIENT
Start: 2019-10-17 | End: 2019-10-22

## 2019-11-05 ENCOUNTER — HOSPITAL ENCOUNTER (OUTPATIENT)
Dept: CARDIAC REHAB | Age: 59
Setting detail: THERAPIES SERIES
Discharge: HOME | End: 2019-11-05
Payer: COMMERCIAL

## 2019-11-05 LAB
GLUCOSE BLD-MCNC: 117 MG/DL (ref 70–99)
GLUCOSE BLD-MCNC: 77 MG/DL (ref 70–99)
GLUCOSE BLD-MCNC: 96 MG/DL (ref 70–99)
PERFORMED ON: ABNORMAL
PERFORMED ON: NORMAL
PERFORMED ON: NORMAL

## 2019-11-05 PROCEDURE — G0424 PULMONARY REHAB W EXER: HCPCS

## 2019-11-07 ENCOUNTER — HOSPITAL ENCOUNTER (OUTPATIENT)
Dept: GENERAL RADIOLOGY | Age: 59
Discharge: HOME | End: 2019-11-07
Payer: COMMERCIAL

## 2019-11-07 ENCOUNTER — HOSPITAL ENCOUNTER (OUTPATIENT)
Age: 59
Discharge: HOME | End: 2019-11-07
Payer: COMMERCIAL

## 2019-11-07 ENCOUNTER — TELEPHONE (OUTPATIENT)
Dept: PHARMACY | Age: 59
End: 2019-11-07

## 2019-11-07 ENCOUNTER — OFFICE VISIT (OUTPATIENT)
Dept: PHARMACY | Age: 59
End: 2019-11-07
Payer: COMMERCIAL

## 2019-11-07 VITALS
SYSTOLIC BLOOD PRESSURE: 104 MMHG | HEART RATE: 113 BPM | TEMPERATURE: 98 F | WEIGHT: 293 LBS | BODY MASS INDEX: 46.22 KG/M2 | OXYGEN SATURATION: 94 % | DIASTOLIC BLOOD PRESSURE: 69 MMHG

## 2019-11-07 DIAGNOSIS — J44.9 COPD, SEVERE (HCC): ICD-10-CM

## 2019-11-07 DIAGNOSIS — J44.9 COPD, SEVERE (HCC): Primary | ICD-10-CM

## 2019-11-07 DIAGNOSIS — I50.32 CHRONIC DIASTOLIC HEART FAILURE (HCC): ICD-10-CM

## 2019-11-07 PROCEDURE — 71046 X-RAY EXAM CHEST 2 VIEWS: CPT

## 2019-11-07 PROCEDURE — 99213 OFFICE O/P EST LOW 20 MIN: CPT | Performed by: NURSE PRACTITIONER

## 2019-11-07 RX ORDER — GUAIFENESIN 600 MG/1
1200 TABLET, EXTENDED RELEASE ORAL 2 TIMES DAILY
Qty: 40 TABLET | Refills: 0 | Status: SHIPPED | OUTPATIENT
Start: 2019-11-07 | End: 2019-11-17

## 2019-11-08 ENCOUNTER — TELEPHONE (OUTPATIENT)
Dept: PHARMACY | Age: 59
End: 2019-11-08

## 2019-12-05 ENCOUNTER — TELEPHONE (OUTPATIENT)
Dept: PHARMACY | Age: 59
End: 2019-12-05

## 2019-12-05 DIAGNOSIS — J44.9 COPD, SEVERE (HCC): ICD-10-CM

## 2019-12-05 RX ORDER — TORSEMIDE 20 MG/1
TABLET ORAL
Qty: 60 TABLET | Refills: 3 | Status: SHIPPED | OUTPATIENT
Start: 2019-12-05

## 2019-12-05 RX ORDER — TORSEMIDE 20 MG/1
20 TABLET ORAL 2 TIMES DAILY
Qty: 60 TABLET | Refills: 2 | Status: SHIPPED | OUTPATIENT
Start: 2019-12-05

## 2019-12-09 ENCOUNTER — TELEPHONE (OUTPATIENT)
Dept: PHARMACY | Age: 59
End: 2019-12-09

## 2019-12-27 ENCOUNTER — SCHEDULED TELEPHONE ENCOUNTER (OUTPATIENT)
Dept: PHARMACY | Age: 59
End: 2019-12-27

## 2020-01-02 RX ORDER — TIOTROPIUM BROMIDE 18 UG/1
CAPSULE ORAL; RESPIRATORY (INHALATION)
Qty: 30 CAPSULE | Refills: 6 | Status: SHIPPED | OUTPATIENT
Start: 2020-01-02

## 2020-01-09 NOTE — TELEPHONE ENCOUNTER
Spiriva isn't covered alternative is Incruse Ellipta ; checked med list and patient hasn't tried Incruse

## 2020-01-09 NOTE — TELEPHONE ENCOUNTER
Left message for the patient that the covered drug incruse was sent to the pharmacy in place of [facility]

## 2020-01-10 ENCOUNTER — SCHEDULED TELEPHONE ENCOUNTER (OUTPATIENT)
Dept: PHARMACY | Age: 60
End: 2020-01-10

## 2020-01-24 ENCOUNTER — SCHEDULED TELEPHONE ENCOUNTER (OUTPATIENT)
Dept: PHARMACY | Age: 60
End: 2020-01-24
Payer: COMMERCIAL

## 2020-01-31 ENCOUNTER — SCHEDULED TELEPHONE ENCOUNTER (OUTPATIENT)
Dept: PHARMACY | Age: 60
End: 2020-01-31

## 2020-01-31 NOTE — TELEPHONE ENCOUNTER
I called and left a message for patient to please call back to schedule an appointment for f/u in the [facility] for both HF and COPD.           [facility] [address]  Anticoagulation, COPD, Heart Failure, Diabetes Services  [phone]

## 2020-02-07 ENCOUNTER — SCHEDULED TELEPHONE ENCOUNTER (OUTPATIENT)
Dept: PHARMACY | Age: 60
End: 2020-02-07

## 2020-02-07 NOTE — TELEPHONE ENCOUNTER
[facility]  COPD Service  [phone]      Follow up phone call:      I called and left a message for patient to please call back to schedule an appointment. PLAN:   We will try to reach patient again in 2 weeks. Appropriate staff has been notified with regards to any concerns noted above     [facility]  COPD Service  Phone: [phone]  Fax [phone]    We also have outpatient services in Heart Failure, Diabetes, Anticoagulation and Infusion.

## 2020-02-08 NOTE — TELEPHONE ENCOUNTER
From: Avila Armijo  To: Kel Padilla MD  Sent: 2/6/2020 8:31 AM CST  Subject: Other    I'm still not doing good I really need more cough medicine and another note for work till Sunday    Tera Collier did call back regarding my call to him and he said that what Candis told us is correct.

## 2020-02-21 ENCOUNTER — SCHEDULED TELEPHONE ENCOUNTER (OUTPATIENT)
Dept: PHARMACY | Age: 60
End: 2020-02-21

## 2020-02-21 NOTE — TELEPHONE ENCOUNTER
[facility]  Heart Failure Service  [phone]     NAME: Den DIAZ [address] [place] [address] RECORD NUMBER:  [id number]. Follow up phone call:      I called and left message for patient to call back to check HF status and schedule appointment if needed. PLAN:   We will discharge her from our services.      [facility]  Heart Failure Service  [phone]

## 2023-06-19 NOTE — PROGRESS NOTES
also follows with Dr. Norma Flannery as needed. Per Dr. Onita Dakins, patient now has NIV. Patient reports she uses CPAP at night and sleeping better. Plan     Action taken:  1. Called Dr. Collin Barton office [phone] and left a message for the nurse to call me back to verify Ms. Etienne's medications prescribed by Dr. Collin Barton.      Recommendations to the physician:  [facility] Service Follow-up: Yes     Follow-up visit will include:   [x] Comprehensive Medication Review with the Pharmacist  [x] Smoking cessation f/u  [x] Other, assessment of recent increase in congestion and mucus production - response to acapella, mucinex and Sodium chloride per nebulizer         Electronically signed by Adiel Fleming, PharmD on 7/31/2019 at 3:43 PM Cecilia